# Patient Record
Sex: FEMALE | Race: WHITE | NOT HISPANIC OR LATINO | Employment: FULL TIME | URBAN - NONMETROPOLITAN AREA
[De-identification: names, ages, dates, MRNs, and addresses within clinical notes are randomized per-mention and may not be internally consistent; named-entity substitution may affect disease eponyms.]

---

## 2017-02-08 LAB
EXTERNAL ABO GROUPING: NORMAL
EXTERNAL ANTIBODY SCREEN: NEGATIVE
EXTERNAL CHLAMYDIA SCREEN: NORMAL
EXTERNAL GONORRHEA SCREEN: NORMAL
EXTERNAL HEPATITIS B SURFACE ANTIGEN: NEGATIVE
EXTERNAL RH FACTOR: POSITIVE
EXTERNAL RUBELLA QUALITATIVE: NORMAL
EXTERNAL SYPHILIS RPR SCREEN: NORMAL
HIV1 AB SPEC QL IA.RAPID: NEGATIVE

## 2017-07-24 ENCOUNTER — HOSPITAL ENCOUNTER (OUTPATIENT)
Facility: HOSPITAL | Age: 22
Discharge: HOME | End: 2017-07-24
Attending: OBSTETRICS & GYNECOLOGY | Admitting: OBSTETRICS & GYNECOLOGY

## 2017-07-24 VITALS
BODY MASS INDEX: 22.22 KG/M2 | RESPIRATION RATE: 16 BRPM | TEMPERATURE: 98.4 F | SYSTOLIC BLOOD PRESSURE: 119 MMHG | HEART RATE: 98 BPM | HEIGHT: 59 IN | DIASTOLIC BLOOD PRESSURE: 75 MMHG | WEIGHT: 110.2 LBS

## 2017-07-24 PROBLEM — Z34.90 PREGNANCY: Status: ACTIVE | Noted: 2017-07-24

## 2017-07-24 LAB
AMORPH URATE CRY URNS QL MICRO: ABNORMAL /HPF
BACTERIA UR QL AUTO: ABNORMAL /HPF
BASOPHILS # BLD AUTO: 0.01 10*3/MM3 (ref 0–0.3)
BASOPHILS NFR BLD AUTO: 0.1 % (ref 0–2)
BILIRUB UR QL STRIP: NEGATIVE
CLARITY UR: CLEAR
COLOR UR: YELLOW
DEPRECATED RDW RBC AUTO: 36.5 FL (ref 37–54)
EOSINOPHIL # BLD AUTO: 0.04 10*3/MM3 (ref 0–0.7)
EOSINOPHIL NFR BLD AUTO: 0.6 % (ref 0–5)
ERYTHROCYTE [DISTWIDTH] IN BLOOD BY AUTOMATED COUNT: 12.6 % (ref 11.5–14.5)
GLUCOSE UR STRIP-MCNC: NEGATIVE MG/DL
HCT VFR BLD AUTO: 30.7 % (ref 37–47)
HGB BLD-MCNC: 10 G/DL (ref 12–16)
HGB UR QL STRIP.AUTO: ABNORMAL
HYALINE CASTS UR QL AUTO: ABNORMAL /LPF
IMM GRANULOCYTES # BLD: 0.02 10*3/MM3 (ref 0–0.03)
IMM GRANULOCYTES NFR BLD: 0.3 % (ref 0–0.5)
KETONES UR QL STRIP: NEGATIVE
LEUKOCYTE ESTERASE UR QL STRIP.AUTO: NEGATIVE
LYMPHOCYTES # BLD AUTO: 1.97 10*3/MM3 (ref 1–3)
LYMPHOCYTES NFR BLD AUTO: 28.3 % (ref 21–51)
MCH RBC QN AUTO: 26.8 PG (ref 27–33)
MCHC RBC AUTO-ENTMCNC: 32.6 G/DL (ref 33–37)
MCV RBC AUTO: 82.3 FL (ref 80–94)
MONOCYTES # BLD AUTO: 0.5 10*3/MM3 (ref 0.1–0.9)
MONOCYTES NFR BLD AUTO: 7.2 % (ref 0–10)
NEUTROPHILS # BLD AUTO: 4.42 10*3/MM3 (ref 1.4–6.5)
NEUTROPHILS NFR BLD AUTO: 63.5 % (ref 30–70)
NITRITE UR QL STRIP: NEGATIVE
PH UR STRIP.AUTO: 7 [PH] (ref 5–8)
PLATELET # BLD AUTO: 273 10*3/MM3 (ref 130–400)
PMV BLD AUTO: 11.6 FL (ref 6–10)
PROT UR QL STRIP: NEGATIVE
RBC # BLD AUTO: 3.73 10*6/MM3 (ref 4.2–5.4)
RBC # UR: ABNORMAL /HPF
REF LAB TEST METHOD: ABNORMAL
SP GR UR STRIP: 1.01 (ref 1–1.03)
SQUAMOUS #/AREA URNS HPF: ABNORMAL /HPF
UROBILINOGEN UR QL STRIP: ABNORMAL
WBC NRBC COR # BLD: 6.96 10*3/MM3 (ref 4.5–12.5)
WBC UR QL AUTO: ABNORMAL /HPF

## 2017-07-24 PROCEDURE — 96372 THER/PROPH/DIAG INJ SC/IM: CPT

## 2017-07-24 PROCEDURE — G0463 HOSPITAL OUTPT CLINIC VISIT: HCPCS

## 2017-07-24 PROCEDURE — 81001 URINALYSIS AUTO W/SCOPE: CPT | Performed by: OBSTETRICS & GYNECOLOGY

## 2017-07-24 PROCEDURE — 25010000002 TERBUTALINE PER 1 MG

## 2017-07-24 PROCEDURE — 36415 COLL VENOUS BLD VENIPUNCTURE: CPT | Performed by: OBSTETRICS & GYNECOLOGY

## 2017-07-24 PROCEDURE — 85025 COMPLETE CBC W/AUTO DIFF WBC: CPT | Performed by: OBSTETRICS & GYNECOLOGY

## 2017-07-24 PROCEDURE — 59025 FETAL NON-STRESS TEST: CPT

## 2017-07-24 RX ORDER — TERBUTALINE SULFATE 1 MG/ML
0.25 INJECTION, SOLUTION SUBCUTANEOUS AS NEEDED
Status: DISCONTINUED | OUTPATIENT
Start: 2017-07-24 | End: 2017-07-24 | Stop reason: HOSPADM

## 2017-07-24 RX ORDER — PRENATAL VIT/IRON FUM/FOLIC AC 27MG-0.8MG
1 TABLET ORAL DAILY
COMMUNITY

## 2017-07-24 RX ORDER — TERBUTALINE SULFATE 1 MG/ML
INJECTION, SOLUTION SUBCUTANEOUS
Status: COMPLETED
Start: 2017-07-24 | End: 2017-07-24

## 2017-07-24 RX ADMIN — TERBUTALINE SULFATE 0.25 MG: 1 INJECTION, SOLUTION SUBCUTANEOUS at 16:51

## 2017-07-24 NOTE — NURSING NOTE
Patient presents with concerns of wiping and seeing blood. Placed on EFM, RNST obtained. CBC wnl, Cath UA showed small amount of blood. DR Bejarano notified of pt statis, orders to give brethine for irritability and discharge home. Nurys Connor RN

## 2017-07-24 NOTE — NON STRESS TEST
Bing Sterling, a  at 35w2d with an CLEMENTE of 2017, by Ultrasound, was seen at [facility] LABOR DELIVERY for a nonstress test.    Chief Complaint   Patient presents with   • Vaginal Bleeding                Nurys Connor, RN  FETAL NONSTRESS TEST REPORT      Gestational age: As recorded in hospital chart    Indication: See hospital chart    Accelerations: Present    Baseline fetal heart rate: 150    Decelerations: Few variables present    Conclusion: Reactive

## 2017-07-26 ENCOUNTER — TRANSCRIBE ORDERS (OUTPATIENT)
Dept: WOMENS IMAGING | Facility: HOSPITAL | Age: 22
End: 2017-07-26

## 2017-07-26 DIAGNOSIS — O36.5930 IUGR (INTRAUTERINE GROWTH RESTRICTION) AFFECTING CARE OF MOTHER, THIRD TRIMESTER, NOT APPLICABLE OR UNSPECIFIED FETUS: Primary | ICD-10-CM

## 2017-07-28 ENCOUNTER — HOSPITAL ENCOUNTER (OUTPATIENT)
Dept: WOMENS IMAGING | Facility: HOSPITAL | Age: 22
Discharge: HOME | End: 2017-07-28
Admitting: OBSTETRICS & GYNECOLOGY

## 2017-07-28 ENCOUNTER — OFFICE VISIT (OUTPATIENT)
Dept: OBSTETRICS AND GYNECOLOGY | Facility: HOSPITAL | Age: 22
End: 2017-07-28

## 2017-07-28 VITALS
BODY MASS INDEX: 23.34 KG/M2 | DIASTOLIC BLOOD PRESSURE: 76 MMHG | HEIGHT: 58 IN | WEIGHT: 111.2 LBS | SYSTOLIC BLOOD PRESSURE: 127 MMHG

## 2017-07-28 DIAGNOSIS — O36.5930 IUGR (INTRAUTERINE GROWTH RESTRICTION) AFFECTING CARE OF MOTHER, THIRD TRIMESTER, NOT APPLICABLE OR UNSPECIFIED FETUS: ICD-10-CM

## 2017-07-28 DIAGNOSIS — Z3A.36 36 WEEKS GESTATION OF PREGNANCY: Primary | ICD-10-CM

## 2017-07-28 DIAGNOSIS — O36.5991 IUGR (INTRAUTERINE GROWTH RESTRICTION) AFFECTING CARE OF MOTHER, UNSPECIFIED TRIMESTER, FETUS 1: ICD-10-CM

## 2017-07-28 PROBLEM — O36.5990 IUGR (INTRAUTERINE GROWTH RESTRICTION) AFFECTING CARE OF MOTHER: Status: ACTIVE | Noted: 2017-07-28

## 2017-07-28 PROCEDURE — 76811 OB US DETAILED SNGL FETUS: CPT | Performed by: OBSTETRICS & GYNECOLOGY

## 2017-07-28 PROCEDURE — 76811 OB US DETAILED SNGL FETUS: CPT

## 2017-07-28 NOTE — PROGRESS NOTES
Denies problems. States has not been offered genetic screening. Next visit with Dr. Bejarano 8/2/2017.

## 2017-07-28 NOTE — PROGRESS NOTES
Documentation of the ultrasound findings, images, and interpretations as well as consultation notes will be available in the patient's ViewPoint report located in the chart review imaging tab in [employer].

## 2017-08-03 LAB — EXTERNAL GROUP B STREP ANTIGEN: NEGATIVE

## 2017-08-23 ENCOUNTER — ANESTHESIA EVENT (OUTPATIENT)
Dept: LABOR AND DELIVERY | Facility: HOSPITAL | Age: 22
End: 2017-08-23

## 2017-08-23 ENCOUNTER — ANESTHESIA (OUTPATIENT)
Dept: LABOR AND DELIVERY | Facility: HOSPITAL | Age: 22
End: 2017-08-23

## 2017-08-23 ENCOUNTER — HOSPITAL ENCOUNTER (INPATIENT)
Facility: HOSPITAL | Age: 22
LOS: 2 days | Discharge: HOME | End: 2017-08-25
Attending: OBSTETRICS & GYNECOLOGY | Admitting: OBSTETRICS & GYNECOLOGY

## 2017-08-23 DIAGNOSIS — Z34.90 PREGNANCY, UNSPECIFIED GESTATIONAL AGE: Primary | ICD-10-CM

## 2017-08-23 LAB
ABO GROUP BLD: NORMAL
BLD GP AB SCN SERPL QL: NEGATIVE
DEPRECATED RDW RBC AUTO: 44.6 FL (ref 37–54)
ERYTHROCYTE [DISTWIDTH] IN BLOOD BY AUTOMATED COUNT: 14.8 % (ref 11.5–14.5)
HCT VFR BLD AUTO: 36.8 % (ref 37–47)
HGB BLD-MCNC: 11.1 G/DL (ref 12–16)
MCH RBC QN AUTO: 25.6 PG (ref 27–33)
MCHC RBC AUTO-ENTMCNC: 30.2 G/DL (ref 33–37)
MCV RBC AUTO: 85 FL (ref 80–94)
PLATELET # BLD AUTO: 233 10*3/MM3 (ref 130–400)
PMV BLD AUTO: 12.7 FL (ref 6–10)
RBC # BLD AUTO: 4.33 10*6/MM3 (ref 4.2–5.4)
RH BLD: POSITIVE
WBC NRBC COR # BLD: 7.7 10*3/MM3 (ref 4.5–12.5)

## 2017-08-23 PROCEDURE — C1755 CATHETER, INTRASPINAL: HCPCS | Performed by: ANESTHESIOLOGY

## 2017-08-23 PROCEDURE — 86901 BLOOD TYPING SEROLOGIC RH(D): CPT | Performed by: OBSTETRICS & GYNECOLOGY

## 2017-08-23 PROCEDURE — 25010000002 BUTORPHANOL PER 1 MG: Performed by: OBSTETRICS & GYNECOLOGY

## 2017-08-23 PROCEDURE — 85027 COMPLETE CBC AUTOMATED: CPT | Performed by: OBSTETRICS & GYNECOLOGY

## 2017-08-23 PROCEDURE — 86850 RBC ANTIBODY SCREEN: CPT | Performed by: OBSTETRICS & GYNECOLOGY

## 2017-08-23 PROCEDURE — 0DQP0ZZ REPAIR RECTUM, OPEN APPROACH: ICD-10-PCS | Performed by: OBSTETRICS & GYNECOLOGY

## 2017-08-23 PROCEDURE — C1755 CATHETER, INTRASPINAL: HCPCS

## 2017-08-23 PROCEDURE — 59025 FETAL NON-STRESS TEST: CPT

## 2017-08-23 PROCEDURE — 86900 BLOOD TYPING SEROLOGIC ABO: CPT | Performed by: OBSTETRICS & GYNECOLOGY

## 2017-08-23 RX ORDER — SODIUM CHLORIDE, SODIUM LACTATE, POTASSIUM CHLORIDE, CALCIUM CHLORIDE 600; 310; 30; 20 MG/100ML; MG/100ML; MG/100ML; MG/100ML
125 INJECTION, SOLUTION INTRAVENOUS CONTINUOUS
Status: DISCONTINUED | OUTPATIENT
Start: 2017-08-23 | End: 2017-08-23

## 2017-08-23 RX ORDER — ACETAMINOPHEN 325 MG/1
650 TABLET ORAL EVERY 4 HOURS PRN
Status: DISCONTINUED | OUTPATIENT
Start: 2017-08-23 | End: 2017-08-25 | Stop reason: HOSPADM

## 2017-08-23 RX ORDER — IBUPROFEN 600 MG/1
600 TABLET ORAL EVERY 8 HOURS PRN
Status: DISCONTINUED | OUTPATIENT
Start: 2017-08-23 | End: 2017-08-23

## 2017-08-23 RX ORDER — PROMETHAZINE HYDROCHLORIDE 25 MG/ML
12.5 INJECTION, SOLUTION INTRAMUSCULAR; INTRAVENOUS EVERY 6 HOURS PRN
Status: DISCONTINUED | OUTPATIENT
Start: 2017-08-23 | End: 2017-08-23 | Stop reason: HOSPADM

## 2017-08-23 RX ORDER — ONDANSETRON 2 MG/ML
4 INJECTION INTRAMUSCULAR; INTRAVENOUS EVERY 6 HOURS PRN
Status: DISCONTINUED | OUTPATIENT
Start: 2017-08-23 | End: 2017-08-25 | Stop reason: HOSPADM

## 2017-08-23 RX ORDER — DIPHENHYDRAMINE HCL 25 MG
25 CAPSULE ORAL NIGHTLY PRN
Status: DISCONTINUED | OUTPATIENT
Start: 2017-08-23 | End: 2017-08-23 | Stop reason: HOSPADM

## 2017-08-23 RX ORDER — LANOLIN 100 %
OINTMENT (GRAM) TOPICAL
Status: DISCONTINUED | OUTPATIENT
Start: 2017-08-23 | End: 2017-08-25 | Stop reason: HOSPADM

## 2017-08-23 RX ORDER — ONDANSETRON 2 MG/ML
4 INJECTION INTRAMUSCULAR; INTRAVENOUS EVERY 6 HOURS PRN
Status: DISCONTINUED | OUTPATIENT
Start: 2017-08-23 | End: 2017-08-23 | Stop reason: HOSPADM

## 2017-08-23 RX ORDER — OXYTOCIN/RINGER'S LACTATE 20/1000 ML
125 PLASTIC BAG, INJECTION (ML) INTRAVENOUS CONTINUOUS
Status: ACTIVE | OUTPATIENT
Start: 2017-08-23 | End: 2017-08-24

## 2017-08-23 RX ORDER — ONDANSETRON 4 MG/1
4 TABLET, ORALLY DISINTEGRATING ORAL EVERY 6 HOURS PRN
Status: DISCONTINUED | OUTPATIENT
Start: 2017-08-23 | End: 2017-08-23 | Stop reason: HOSPADM

## 2017-08-23 RX ORDER — IBUPROFEN 800 MG/1
800 TABLET ORAL EVERY 8 HOURS SCHEDULED
Status: DISCONTINUED | OUTPATIENT
Start: 2017-08-23 | End: 2017-08-25 | Stop reason: HOSPADM

## 2017-08-23 RX ORDER — CARBOPROST TROMETHAMINE 250 UG/ML
250 INJECTION, SOLUTION INTRAMUSCULAR AS NEEDED
Status: DISCONTINUED | OUTPATIENT
Start: 2017-08-23 | End: 2017-08-23 | Stop reason: HOSPADM

## 2017-08-23 RX ORDER — OXYTOCIN/RINGER'S LACTATE 20/1000 ML
2 PLASTIC BAG, INJECTION (ML) INTRAVENOUS CONTINUOUS
Status: DISCONTINUED | OUTPATIENT
Start: 2017-08-23 | End: 2017-08-23

## 2017-08-23 RX ORDER — HYDROCODONE BITARTRATE AND ACETAMINOPHEN 5; 325 MG/1; MG/1
1 TABLET ORAL EVERY 4 HOURS PRN
Status: DISCONTINUED | OUTPATIENT
Start: 2017-08-23 | End: 2017-08-25 | Stop reason: HOSPADM

## 2017-08-23 RX ORDER — LIDOCAINE HYDROCHLORIDE 10 MG/ML
5 INJECTION, SOLUTION INFILTRATION; PERINEURAL AS NEEDED
Status: DISCONTINUED | OUTPATIENT
Start: 2017-08-23 | End: 2017-08-23 | Stop reason: HOSPADM

## 2017-08-23 RX ORDER — DIPHENHYDRAMINE HYDROCHLORIDE 50 MG/ML
25 INJECTION INTRAMUSCULAR; INTRAVENOUS NIGHTLY PRN
Status: DISCONTINUED | OUTPATIENT
Start: 2017-08-23 | End: 2017-08-23 | Stop reason: HOSPADM

## 2017-08-23 RX ORDER — ONDANSETRON 4 MG/1
4 TABLET, FILM COATED ORAL EVERY 8 HOURS PRN
Status: DISCONTINUED | OUTPATIENT
Start: 2017-08-23 | End: 2017-08-25 | Stop reason: HOSPADM

## 2017-08-23 RX ORDER — PRENATAL VIT/IRON FUM/FOLIC AC 27MG-0.8MG
1 TABLET ORAL DAILY
Status: DISCONTINUED | OUTPATIENT
Start: 2017-08-24 | End: 2017-08-25 | Stop reason: HOSPADM

## 2017-08-23 RX ORDER — EPHEDRINE SULFATE 50 MG/ML
10 INJECTION, SOLUTION INTRAVENOUS
Status: DISCONTINUED | OUTPATIENT
Start: 2017-08-23 | End: 2017-08-23 | Stop reason: HOSPADM

## 2017-08-23 RX ORDER — BISACODYL 10 MG
10 SUPPOSITORY, RECTAL RECTAL DAILY PRN
Status: DISCONTINUED | OUTPATIENT
Start: 2017-08-24 | End: 2017-08-25 | Stop reason: HOSPADM

## 2017-08-23 RX ORDER — MAGNESIUM HYDROXIDE 1200 MG/15ML
1000 LIQUID ORAL ONCE AS NEEDED
Status: DISCONTINUED | OUTPATIENT
Start: 2017-08-23 | End: 2017-08-23 | Stop reason: HOSPADM

## 2017-08-23 RX ORDER — ROPIVACAINE HYDROCHLORIDE 2 MG/ML
INJECTION, SOLUTION EPIDURAL; INFILTRATION; PERINEURAL
Status: DISCONTINUED
Start: 2017-08-23 | End: 2017-08-25 | Stop reason: HOSPADM

## 2017-08-23 RX ORDER — TERBUTALINE SULFATE 1 MG/ML
0.25 INJECTION, SOLUTION SUBCUTANEOUS AS NEEDED
Status: DISCONTINUED | OUTPATIENT
Start: 2017-08-23 | End: 2017-08-23 | Stop reason: HOSPADM

## 2017-08-23 RX ORDER — FAMOTIDINE 10 MG/ML
20 INJECTION, SOLUTION INTRAVENOUS ONCE AS NEEDED
Status: DISCONTINUED | OUTPATIENT
Start: 2017-08-23 | End: 2017-08-23 | Stop reason: HOSPADM

## 2017-08-23 RX ORDER — SODIUM CHLORIDE 0.9 % (FLUSH) 0.9 %
1-10 SYRINGE (ML) INJECTION AS NEEDED
Status: DISCONTINUED | OUTPATIENT
Start: 2017-08-23 | End: 2017-08-25 | Stop reason: HOSPADM

## 2017-08-23 RX ORDER — BUPIVACAINE HYDROCHLORIDE 2.5 MG/ML
INJECTION, SOLUTION EPIDURAL; INFILTRATION; INTRACAUDAL
Status: DISCONTINUED
Start: 2017-08-23 | End: 2017-08-25 | Stop reason: HOSPADM

## 2017-08-23 RX ORDER — ZOLPIDEM TARTRATE 5 MG/1
5 TABLET ORAL NIGHTLY PRN
Status: DISCONTINUED | OUTPATIENT
Start: 2017-08-23 | End: 2017-08-25 | Stop reason: HOSPADM

## 2017-08-23 RX ORDER — ACETAMINOPHEN 325 MG/1
650 TABLET ORAL EVERY 4 HOURS PRN
Status: DISCONTINUED | OUTPATIENT
Start: 2017-08-23 | End: 2017-08-23 | Stop reason: HOSPADM

## 2017-08-23 RX ORDER — ONDANSETRON 4 MG/1
4 TABLET, FILM COATED ORAL EVERY 6 HOURS PRN
Status: DISCONTINUED | OUTPATIENT
Start: 2017-08-23 | End: 2017-08-23 | Stop reason: HOSPADM

## 2017-08-23 RX ORDER — BUPIVACAINE HYDROCHLORIDE 2.5 MG/ML
INJECTION, SOLUTION EPIDURAL; INFILTRATION; INTRACAUDAL AS NEEDED
Status: DISCONTINUED | OUTPATIENT
Start: 2017-08-23 | End: 2017-08-23 | Stop reason: SURG

## 2017-08-23 RX ORDER — PROMETHAZINE HYDROCHLORIDE 12.5 MG/1
12.5 SUPPOSITORY RECTAL EVERY 6 HOURS PRN
Status: DISCONTINUED | OUTPATIENT
Start: 2017-08-23 | End: 2017-08-23 | Stop reason: HOSPADM

## 2017-08-23 RX ORDER — MISOPROSTOL 100 UG/1
600 TABLET ORAL ONCE
Status: DISCONTINUED | OUTPATIENT
Start: 2017-08-23 | End: 2017-08-23

## 2017-08-23 RX ORDER — METHYLERGONOVINE MALEATE 0.2 MG/ML
200 INJECTION INTRAVENOUS ONCE AS NEEDED
Status: DISCONTINUED | OUTPATIENT
Start: 2017-08-23 | End: 2017-08-23 | Stop reason: HOSPADM

## 2017-08-23 RX ORDER — ONDANSETRON 2 MG/ML
4 INJECTION INTRAMUSCULAR; INTRAVENOUS ONCE AS NEEDED
Status: DISCONTINUED | OUTPATIENT
Start: 2017-08-23 | End: 2017-08-23 | Stop reason: HOSPADM

## 2017-08-23 RX ORDER — DOCUSATE SODIUM 100 MG/1
100 CAPSULE, LIQUID FILLED ORAL 2 TIMES DAILY
Status: DISCONTINUED | OUTPATIENT
Start: 2017-08-23 | End: 2017-08-25 | Stop reason: HOSPADM

## 2017-08-23 RX ORDER — SODIUM CHLORIDE 0.9 % (FLUSH) 0.9 %
1-10 SYRINGE (ML) INJECTION AS NEEDED
Status: DISCONTINUED | OUTPATIENT
Start: 2017-08-23 | End: 2017-08-23 | Stop reason: HOSPADM

## 2017-08-23 RX ORDER — MISOPROSTOL 100 UG/1
800 TABLET ORAL AS NEEDED
Status: DISCONTINUED | OUTPATIENT
Start: 2017-08-23 | End: 2017-08-23 | Stop reason: HOSPADM

## 2017-08-23 RX ORDER — ROPIVACAINE HYDROCHLORIDE 2 MG/ML
14 INJECTION, SOLUTION EPIDURAL; INFILTRATION; PERINEURAL CONTINUOUS
Status: DISCONTINUED | OUTPATIENT
Start: 2017-08-23 | End: 2017-08-23

## 2017-08-23 RX ORDER — OXYTOCIN/RINGER'S LACTATE 20/1000 ML
2 PLASTIC BAG, INJECTION (ML) INTRAVENOUS
Status: DISCONTINUED | OUTPATIENT
Start: 2017-08-23 | End: 2017-08-23 | Stop reason: HOSPADM

## 2017-08-23 RX ORDER — PROMETHAZINE HYDROCHLORIDE 25 MG/1
12.5 TABLET ORAL EVERY 6 HOURS PRN
Status: DISCONTINUED | OUTPATIENT
Start: 2017-08-23 | End: 2017-08-23 | Stop reason: HOSPADM

## 2017-08-23 RX ORDER — BUTORPHANOL TARTRATE 1 MG/ML
1 INJECTION, SOLUTION INTRAMUSCULAR; INTRAVENOUS
Status: DISCONTINUED | OUTPATIENT
Start: 2017-08-23 | End: 2017-08-23 | Stop reason: HOSPADM

## 2017-08-23 RX ADMIN — SODIUM CHLORIDE, SODIUM LACTATE, POTASSIUM CHLORIDE, AND CALCIUM CHLORIDE 125 ML/HR: 600; 310; 30; 20 INJECTION, SOLUTION INTRAVENOUS at 13:39

## 2017-08-23 RX ADMIN — BUTORPHANOL TARTRATE 1 MG: 1 INJECTION, SOLUTION INTRAMUSCULAR; INTRAVENOUS at 13:18

## 2017-08-23 RX ADMIN — OXYTOCIN 150 ML/HR: 10 INJECTION INTRAVENOUS at 17:34

## 2017-08-23 RX ADMIN — BUTORPHANOL TARTRATE 1 MG: 1 INJECTION, SOLUTION INTRAMUSCULAR; INTRAVENOUS at 10:55

## 2017-08-23 RX ADMIN — SODIUM CHLORIDE, SODIUM LACTATE, POTASSIUM CHLORIDE, AND CALCIUM CHLORIDE 125 ML/HR: 600; 310; 30; 20 INJECTION, SOLUTION INTRAVENOUS at 06:24

## 2017-08-23 RX ADMIN — SODIUM CHLORIDE, POTASSIUM CHLORIDE, SODIUM LACTATE AND CALCIUM CHLORIDE 1000 ML: 600; 310; 30; 20 INJECTION, SOLUTION INTRAVENOUS at 13:27

## 2017-08-23 RX ADMIN — IBUPROFEN 800 MG: 800 TABLET ORAL at 22:11

## 2017-08-23 RX ADMIN — OXYTOCIN 2 MILLI-UNITS/MIN: 10 INJECTION INTRAVENOUS at 07:15

## 2017-08-23 RX ADMIN — BUPIVACAINE HYDROCHLORIDE 10 ML: 2.5 INJECTION, SOLUTION EPIDURAL; INFILTRATION; INTRACAUDAL; PERINEURAL at 14:01

## 2017-08-23 NOTE — PLAN OF CARE
Problem: Labor (Cervical Ripen, Induct, Augment) (Adult,Obstetrics,Pediatric)  Goal: Signs and Symptoms of Listed Potential Problems Will be Absent or Manageable (Labor)  Outcome: Ongoing (interventions implemented as appropriate)    08/23/17 0616   Labor (Cervical Ripen, Induct, Augment)   Problems Assessed (Labor) all   Problems Present (Labor) none

## 2017-08-23 NOTE — NURSING NOTE
New admit to room 228 to the services of Dr. Carlee patino 22 y.o. @ 39 4/7 weeks gestation here for induction of labor for term

## 2017-08-23 NOTE — H&P
HISTORY    Chief complaint  Term pregnancy for induction    History of present illness  This 22 year old  patient at 39 weeks +4 days is admitted to the hospital with a term pregnancy for induction and delivery.    Past medical, surgical, obstetrical history  See attached prenatal record    Family history  See attached prenatal record    Social history  See attached prenatal record    Functional inquiry  See attached prenatal record                             PHYSICAL EXAMINATION    General  In no acute distress    HEENT  Throat and ears are clear, no masses or nodes were palpable    CVR  Heart sounds are normal with no murmurs, chest is clear to IPPA    GI/  Abdomen is soft with no masses tenderness or organomegaly.  Uterine size is commensurate with her dates.  Cervix is as noted elsewhere in chart    MS  No gross bone or joint abnormalities.                                        IMPRESSION    Term pregnancy for induction and delivery    TREATMENT PLAN    I ruptured her membranes for clear fluid.  Her cervix is 2 cm, 50%, -2.  We have Pitocin running.  Spontaneous vaginal delivery.

## 2017-08-23 NOTE — NON STRESS TEST
Bing Sterling, a  at 39w4d with an CLEMENTE of 2017, by Ultrasound, was seen at [facility] LABOR DELIVERY for a nonstress test.    Chief Complaint   Patient presents with   • Scheduled Induction       Interpretation A  Nonstress Test Interpretation A: Reactive (17 : Suzie Romo, RN)  Comments A: Verified by SARAH Laurent RN (17 : Suzie Romo RN)        Reason for test: OB Triage (Scheduled Induction)  Date of Test: 2017  Time frame of test: 20 minutes  RN NST Interpretation: Reactive

## 2017-08-23 NOTE — L&D DELIVERY NOTE
Vaginal Delivery Procedure Note    Bing Sterling  Gestational Age: <None> .        OBGYN: Juan Pablo Bejarano MD      Pre-op Diagnosis: Complete Dilation      Anesthesia: Epidual        Detailed Description of Procedure   This patient came to the hospitalist morning with a term pregnancy for induction and delivery.  Membranes were ruptured, Pitocin was started, and she had a normal course of labor.  She went on to deliver a healthy female child named Antonette .  She had bradycardia just before delivery and she was not pushing effectively and I felt that we should expedite delivery for the baby's sake.  The baby was sitting right on the perineum.  I applied a Kiwi vacuum extractor and with one easy pull, she delivered.  She sustained a fourth degree extension of her midline episiotomy.  This was sutured in the usual fashion, taking care to put interrupted sutures in the rectal mucosa, followed by interrupted sutures in the rectal sphincter capsule at 3:00, 6:00, 9:00, 12:00, with a figure of 8 suture in the muscle itself.  Baby is doing well.  Placenta delivered intact. Father, mother, and baby are all stable in the birthing room at this time.         Maternal Blood Type: A   Amniotic Fluid Clear  Blood Cord: Yes  Estimated Blood Loss: * No surgery found *  Placenta: Spontaneous, Delivered Intact   Uterus Explored: Yes    Disposition: Transfer to Women's Health Floor  Condition: Stable    Juan Pablo Bejarano M.D     Date: 8/23/2017  Time: 4:52 PM

## 2017-08-23 NOTE — ANESTHESIA PREPROCEDURE EVALUATION
Anesthesia Evaluation     Patient summary reviewed and Nursing notes reviewed   no history of anesthetic complications:  NPO Solid Status: > 8 hours  NPO Liquid Status: > 8 hours     Airway   Mallampati: II  TM distance: >3 FB  Neck ROM: full  no difficulty expected  Dental - normal exam     Pulmonary - negative pulmonary ROS and normal exam   Cardiovascular - negative cardio ROS and normal exam  Exercise tolerance: good (4-7 METS)    NYHA Classification: II        Neuro/Psych- negative ROS  GI/Hepatic/Renal/Endo - negative ROS     Musculoskeletal (-) negative ROS    Abdominal  - normal exam   Substance History - negative use     OB/GYN negative ob/gyn ROS         Other - negative ROS                                       Anesthesia Plan    ASA 2     epidural     intravenous induction   Anesthetic plan and risks discussed with patient.

## 2017-08-23 NOTE — ANESTHESIA PROCEDURE NOTES
Labor Epidural    Patient location during procedure: OB  Start Time: 8/23/2017 1:58 PM  Stop Time: 8/23/2017 2:06 PM  Performed By  Anesthesiologist: JUAN CARLOS WU  Preanesthetic Checklist  Completed: patient identified, site marked, surgical consent, pre-op evaluation, timeout performed, IV checked, risks and benefits discussed and monitors and equipment checked  Prep:  Pt Position:sitting  Sterile Tech:gloves, mask, sterile barrier and cap  Prep:povidone-iodine 7.5% surgical scrub  Monitoring:blood pressure monitoring  Epidural Block Procedure:  Approach:midline  Guidance:landmark technique and palpation technique  Location:L3-L4  Needle Type:Tuohy  Needle Gauge:17 G  Loss of Resistance Medium: air  Loss of Resistance: 8cm  Cath Depth at skin:11 cm  Paresthesia: none  Aspiration:negative  Test Dose:negative  Number of Attempts: 1  Post Assessment:  Dressing:occlusive dressing applied and secured with tape  Pt Tolerance:patient tolerated the procedure well with no apparent complications  Complications:no

## 2017-08-24 LAB
BASOPHILS # BLD AUTO: 0.02 10*3/MM3 (ref 0–0.3)
BASOPHILS NFR BLD AUTO: 0.2 % (ref 0–2)
DEPRECATED RDW RBC AUTO: 41.4 FL (ref 37–54)
EOSINOPHIL # BLD AUTO: 0.04 10*3/MM3 (ref 0–0.7)
EOSINOPHIL NFR BLD AUTO: 0.4 % (ref 0–5)
ERYTHROCYTE [DISTWIDTH] IN BLOOD BY AUTOMATED COUNT: 14.3 % (ref 11.5–14.5)
HCT VFR BLD AUTO: 27.5 % (ref 37–47)
HGB BLD-MCNC: 8.7 G/DL (ref 12–16)
IMM GRANULOCYTES # BLD: 0.03 10*3/MM3 (ref 0–0.03)
IMM GRANULOCYTES NFR BLD: 0.3 % (ref 0–0.5)
LYMPHOCYTES # BLD AUTO: 2.44 10*3/MM3 (ref 1–3)
LYMPHOCYTES NFR BLD AUTO: 23.8 % (ref 21–51)
MCH RBC QN AUTO: 25.4 PG (ref 27–33)
MCHC RBC AUTO-ENTMCNC: 31.6 G/DL (ref 33–37)
MCV RBC AUTO: 80.2 FL (ref 80–94)
MONOCYTES # BLD AUTO: 0.94 10*3/MM3 (ref 0.1–0.9)
MONOCYTES NFR BLD AUTO: 9.2 % (ref 0–10)
NEUTROPHILS # BLD AUTO: 6.8 10*3/MM3 (ref 1.4–6.5)
NEUTROPHILS NFR BLD AUTO: 66.1 % (ref 30–70)
PLATELET # BLD AUTO: 187 10*3/MM3 (ref 130–400)
PMV BLD AUTO: 12.4 FL (ref 6–10)
RBC # BLD AUTO: 3.43 10*6/MM3 (ref 4.2–5.4)
WBC NRBC COR # BLD: 10.27 10*3/MM3 (ref 4.5–12.5)

## 2017-08-24 PROCEDURE — 85025 COMPLETE CBC W/AUTO DIFF WBC: CPT | Performed by: OBSTETRICS & GYNECOLOGY

## 2017-08-24 RX ADMIN — BENZOCAINE AND MENTHOL: 20; .5 SPRAY TOPICAL at 14:54

## 2017-08-24 RX ADMIN — HYDROCODONE BITARTRATE AND ACETAMINOPHEN 1 TABLET: 5; 325 TABLET ORAL at 14:54

## 2017-08-24 RX ADMIN — DOCUSATE SODIUM 100 MG: 100 CAPSULE, LIQUID FILLED ORAL at 09:16

## 2017-08-24 RX ADMIN — IBUPROFEN 800 MG: 800 TABLET ORAL at 06:35

## 2017-08-24 RX ADMIN — IBUPROFEN 800 MG: 800 TABLET ORAL at 21:20

## 2017-08-24 RX ADMIN — PRENATAL VIT W/ FE FUMARATE-FA TAB 27-0.8 MG 1 TABLET: 27-0.8 TAB at 09:16

## 2017-08-24 RX ADMIN — DOCUSATE SODIUM 100 MG: 100 CAPSULE, LIQUID FILLED ORAL at 18:05

## 2017-08-24 RX ADMIN — IBUPROFEN 800 MG: 800 TABLET ORAL at 14:54

## 2017-08-24 NOTE — PLAN OF CARE
Problem: Patient Care Overview (Adult)  Goal: Plan of Care Review  Outcome: Ongoing (interventions implemented as appropriate)    08/24/17 0551   Coping/Psychosocial Response Interventions   Plan Of Care Reviewed With patient;spouse       Goal: Adult Individualization and Mutuality  Outcome: Ongoing (interventions implemented as appropriate)  Goal: Discharge Needs Assessment  Outcome: Ongoing (interventions implemented as appropriate)    Problem: Postpartum, Vaginal Delivery (Adult)  Intervention: Support Life/Role Transition    08/23/17 5477   Coping Strategies   Trust Relationship/Rapport care explained;choices provided;questions answered;questions encouraged;reassurance provided   Family/Support System Care involvement promoted;presence promoted;support provided         Goal: Signs and Symptoms of Listed Potential Problems Will be Absent or Manageable (Postpartum, Vaginal Delivery)  Outcome: Ongoing (interventions implemented as appropriate)

## 2017-08-24 NOTE — PAYOR COMM NOTE
"CONTACT:  LUIS DAILEY RN, BSN  UTILIZATION MANAGEMENT DEPT.  [facility]  [address] [facility] [address]  PHONE:  [phone]  FAX: [phone]    REQUEST FOR INPATIENT DELIVERY AUTHORIZATION.    ICD 10 CODE: O80  [facility] NPI: [id number]  DR. CARLA BEJARANO NPI: [id number]    PT ADMITTED 17, DELIVERED VAGINALLY 17, WELL BABY GIRL, REGULAR NURSERY, WEIGHT 2752 GRAMS, APGARS 9/9, EDC 17, GA 39/4, . BABY'S NAME NOT AVAILABLE YET.    Bing Sterling (22 y.o. Female)     Date of Birth Social Security Number Address Home Phone MRN    1995  [address]  [facility] [address] [phone] [id number]    [place] Marital Status          None        Admission Date Admission Type Admitting Provider Attending Provider Department, Room/Bed    17 Elective Carla Bejarano MD Cass, Roger Leroy, MD [facility], W243/    Discharge Date Discharge Disposition Discharge Destination                      Attending Provider: Carla Bejarano MD     Allergies:  No Known Allergies    Isolation:  None   Infection:  None   Code Status:  FULL    Ht:  59\" (149.9 cm)   Wt:  115 lb (52.2 kg)    Admission Cmt:  None   Principal Problem:  None                Active Insurance as of 2017     Primary Coverage     Payor Plan Insurance Group Employer/Plan Group    ANTHEM MEDICAID [employer] MEDICAID [id number]     Payor Plan Address Payor Plan Phone Number Effective From Effective To    [address] [address] [phone] 2017     [place] [address]       Subscriber Name Subscriber Birth Date Member ID       BING STERLING 1995 [id number]                 Emergency Contacts      (Rel.) Home Phone Work Phone Mobile Phone    Daren Sterling (Spouse) [phone] -- --               History & Physical      H&P filed by Beatriz Santos MD at 2017  9:03 AM      Scan on 2017 : [employer]  2017 (below)              " Electronically signed by Gemini, Cesario Incoming at 2017  9:03 AM      Juan Pablo Bejarano MD at 2017  9:14 AM                                                     HISTORY    Chief complaint  Term pregnancy for induction    History of present illness  This 22 year old  patient at 39 weeks +4 days is admitted to the hospital with a term pregnancy for induction and delivery.    Past medical, surgical, obstetrical history  See attached prenatal record    Family history  See attached prenatal record    Social history  See attached prenatal record    Functional inquiry  See attached prenatal record                             PHYSICAL EXAMINATION    General  In no acute distress    HEENT  Throat and ears are clear, no masses or nodes were palpable    CVR  Heart sounds are normal with no murmurs, chest is clear to IPPA    GI/  Abdomen is soft with no masses tenderness or organomegaly.  Uterine size is commensurate with her dates.  Cervix is as noted elsewhere in chart    MS  No gross bone or joint abnormalities.                                        IMPRESSION    Term pregnancy for induction and delivery    TREATMENT PLAN    I ruptured her membranes for clear fluid.  Her cervix is 2 cm, 50%, -2.  We have Pitocin running.  Spontaneous vaginal delivery.         Electronically signed by Juan Pablo Bejarano MD at 2017  9:14 AM           Operative/Procedure Notes (all)      Juan Pablo Bejarano MD at 2017  4:52 PM  Version 1 of 1         Vaginal Delivery Procedure Note    Bing Sterling  Gestational Age: <None> .        OBGYN: Juan Pablo Bejarano MD      Pre-op Diagnosis: Complete Dilation      Anesthesia: Epidual        Detailed Description of Procedure   This patient came to the hospitalist morning with a term pregnancy for induction and delivery.  Membranes were ruptured, Pitocin was started, and she had a normal course of labor.  She went on to deliver a healthy female child named Antonette .  She had  bradycardia just before delivery and she was not pushing effectively and I felt that we should expedite delivery for the baby's sake.  The baby was sitting right on the perineum.  I applied a Kiwi vacuum extractor and with one easy pull, she delivered.  She sustained a fourth degree extension of her midline episiotomy.  This was sutured in the usual fashion, taking care to put interrupted sutures in the rectal mucosa, followed by interrupted sutures in the rectal sphincter capsule at 3:00, 6:00, 9:00, 12:00, with a figure of 8 suture in the muscle itself.  Baby is doing well.  Placenta delivered intact. Father, mother, and baby are all stable in the birthing room at this time.         Maternal Blood Type: A   Amniotic Fluid Clear  Blood Cord: Yes  Estimated Blood Loss: * No surgery found *  Placenta: Spontaneous, Delivered Intact   Uterus Explored: Yes    Disposition: Transfer to Women's Health Floor  Condition: Stable    Juan Pablo Bejarano M.D     Date: 8/23/2017  Time: 4:52 PM         Electronically signed by Juan Pablo Bejarano MD at 8/23/2017  4:52 PM        Physician Progress Notes (all)     No notes of this type exist for this encounter.

## 2017-08-25 VITALS
RESPIRATION RATE: 18 BRPM | OXYGEN SATURATION: 100 % | HEART RATE: 72 BPM | SYSTOLIC BLOOD PRESSURE: 120 MMHG | TEMPERATURE: 97.9 F | BODY MASS INDEX: 23.18 KG/M2 | HEIGHT: 59 IN | DIASTOLIC BLOOD PRESSURE: 77 MMHG | WEIGHT: 115 LBS

## 2017-08-25 PROBLEM — Z34.90 PREGNANCY: Status: RESOLVED | Noted: 2017-07-24 | Resolved: 2017-08-25

## 2017-08-25 PROBLEM — O36.5990 IUGR (INTRAUTERINE GROWTH RESTRICTION) AFFECTING CARE OF MOTHER: Status: RESOLVED | Noted: 2017-07-28 | Resolved: 2017-08-25

## 2017-08-25 RX ORDER — IBUPROFEN 600 MG/1
600 TABLET ORAL EVERY 8 HOURS SCHEDULED
Qty: 40 TABLET | Refills: 1 | Status: ON HOLD | OUTPATIENT
Start: 2017-08-25 | End: 2021-12-21

## 2017-08-25 RX ORDER — PSEUDOEPHEDRINE HCL 30 MG
100 TABLET ORAL 2 TIMES DAILY
Qty: 60 CAPSULE | Refills: 1 | Status: ON HOLD | OUTPATIENT
Start: 2017-08-25 | End: 2021-12-21

## 2017-08-25 RX ORDER — FERROUS SULFATE 325(65) MG
325 TABLET ORAL 2 TIMES DAILY WITH MEALS
Status: DISCONTINUED | OUTPATIENT
Start: 2017-08-25 | End: 2017-08-25 | Stop reason: HOSPADM

## 2017-08-25 RX ORDER — LANOLIN ALCOHOL/MO/W.PET/CERES
325 CREAM (GRAM) TOPICAL 2 TIMES DAILY WITH MEALS
Qty: 60 TABLET | Refills: 1 | Status: ON HOLD | OUTPATIENT
Start: 2017-08-25 | End: 2022-02-28

## 2017-08-25 RX ADMIN — IBUPROFEN 800 MG: 800 TABLET ORAL at 06:53

## 2017-08-25 RX ADMIN — PRENATAL VIT W/ FE FUMARATE-FA TAB 27-0.8 MG 1 TABLET: 27-0.8 TAB at 08:05

## 2017-08-25 RX ADMIN — DOCUSATE SODIUM 100 MG: 100 CAPSULE, LIQUID FILLED ORAL at 08:05

## 2017-08-25 NOTE — PROGRESS NOTES
" [place]  Vaginal Delivery Progress Note    Subjective   Subjective  Postpartum Day 2: Vaginal Delivery    The patient feels tired.  Her pain is moderately controlled with nonsteroidal anti-inflammatory drugs.   She is ambulating well.  Patient describes her bleeding as moderate lochia. Pt denies BM since delivery will continue stool softeners at home.  All questions anwered.    Breastfeeding: declines.    Objective     Objective   Vital Signs Range for the last 24 hours  Temperature:  97.1   Temp Source:     BP:  126/88   Pulse:  75   Respirations:  18   Weight:  115     Admit Height:  Height: 59\" (149.9 cm)    Physical Exam:  General:  no acute distresss.  Abdomen: Fundus: appropriate, firm, non tender  Extremities: normal, atraumatic, no cyanosis, and trace edema.       [unfilled]       Lab Results   Component Value Date    ABO A 08/23/2017    RH Positive 08/23/2017        Lab Results   Component Value Date    HGB 8.7 (L) 08/24/2017    HCT 27.5 (L) 08/24/2017         Assessment/Plan   Assessment & Plan  Active Problems:    Pregnancy      Bing Sterling is Day 2  post-partum  Vaginal, Vacuum (Extractor)  4th degree extension .      Plan:  Continue current care.      GABRIELA Short  8/25/2017  8:42 AM    "

## 2017-08-25 NOTE — PLAN OF CARE
Problem: Patient Care Overview (Adult)  Goal: Plan of Care Review  Outcome: Ongoing (interventions implemented as appropriate)    08/25/17 0333   Coping/Psychosocial Response Interventions   Plan Of Care Reviewed With patient   Patient Care Overview   Progress progress toward functional goals as expected   Outcome Evaluation   Outcome Summary/Follow up Plan pt done well. no c/o pain this shift. bleeding normal.        Goal: Adult Individualization and Mutuality  Outcome: Ongoing (interventions implemented as appropriate)  Goal: Discharge Needs Assessment  Outcome: Ongoing (interventions implemented as appropriate)    Problem: Postpartum, Vaginal Delivery (Adult)  Goal: Signs and Symptoms of Listed Potential Problems Will be Absent or Manageable (Postpartum, Vaginal Delivery)  Outcome: Ongoing (interventions implemented as appropriate)

## 2017-08-25 NOTE — DISCHARGE SUMMARY
LEATHA Lerma  Delivery Discharge Summary    Primary OB Clinician:     EDC: Estimated Date of Delivery: 17    Gestational Age:39w4d    Antepartum complications: none    Date of Delivery: 2017   Time of Delivery: 4:04 PM     Delivered By:  Juan Pablo Bejarano     Delivery Type: Vaginal, Vacuum (Extractor)      Tubal Ligation: n/a    Baby:Female   Apgar:  9   @ 1 minute /   Apgar:  9   @ 5 minutes   Weight:  6lb 1.1oz    Anesthesia: Epidural      Intrapartum complications: None    Laceration: Yes  Laceration Information  Laceration Repaired?   Perineal: 4th       Periurethral:         Labial:         Sulcus:         Vaginal: No       Cervical: No              Episiotomy: Yes     Placenta: Spontaneous     Feeding method: Breastfeeding Status: Unknown    [unfilled]       Lab Results   Component Value Date    ABO A 2017    RH Positive 2017        Lab Results   Component Value Date    HGB 8.7 (L) 2017    HCT 27.5 (L) 2017       Rh Immune globulin given: not applicable      Discharge Date: 2017; Discharge Time: 9:47 AM        Plan:    Address and phone number verified and same.  Follow-up appointment with  in 3 weeks.      GABRIELA Short  2017  9:45 AM

## 2017-08-25 NOTE — PLAN OF CARE
Problem: Patient Care Overview (Adult)  Goal: Plan of Care Review  Outcome: Outcome(s) achieved Date Met:  08/25/17 08/25/17 1145   Coping/Psychosocial Response Interventions   Plan Of Care Reviewed With patient   Patient Care Overview   Progress progress toward functional goals as expected   Outcome Evaluation   Outcome Summary/Follow up Plan patient is doing well, being discharged home able to care for self and infant,        Goal: Adult Individualization and Mutuality  Outcome: Outcome(s) achieved Date Met:  08/25/17  Goal: Discharge Needs Assessment  Outcome: Outcome(s) achieved Date Met:  08/25/17    Problem: Postpartum, Vaginal Delivery (Adult)  Goal: Signs and Symptoms of Listed Potential Problems Will be Absent or Manageable (Postpartum, Vaginal Delivery)  Outcome: Outcome(s) achieved Date Met:  08/25/17

## 2017-08-26 NOTE — PAYOR COMM NOTE
"[facility]   ONESIMO ARGUELLO  PHONE  [phone]  FAX  [phone]    PATIENT D/C 8/25/17    Bing Ortiz (22 y.o. Female)     Date of Birth Social Security Number Address Home Phone MRN    1995  [address]  [place] [address] [phone] [id number]    [place] Marital Status          None        Admission Date Admission Type Admitting Provider Attending Provider Department, Room/Bed    8/23/17 Elective Juan Pablo Bejarano MD  [facility], W243/1    Discharge Date Discharge Disposition Discharge Destination        8/25/2017 Home or Self Care Home            Attending Provider: (none)    Allergies:  No Known Allergies    Isolation:  None   Infection:  None   Code Status:  Prior    Ht:  59\" (149.9 cm)   Wt:  115 lb (52.2 kg)    Admission Cmt:  None   Principal Problem:  None                Active Insurance as of 8/23/2017     Primary Coverage     Payor Plan Insurance Group Employer/Plan Group    ANTH MEDICAID [facility] MEDICAID [id number]     Payor Plan Address Payor Plan Phone Number Effective From Effective To    [address] [address] [phone] 1/1/2017     [place] [address]       Subscriber Name Subscriber Birth Date Member ID       BING ORTIZ 1995 [id number]                 Emergency Contacts      (Rel.) Home Phone Work Phone Mobile Phone    Daren Ortiz (Spouse) [phone] -- --              "

## 2019-07-02 ENCOUNTER — HOSPITAL ENCOUNTER (EMERGENCY)
Facility: HOSPITAL | Age: 24
Discharge: HOME | End: 2019-07-02
Attending: EMERGENCY MEDICINE | Admitting: EMERGENCY MEDICINE

## 2019-07-02 VITALS
BODY MASS INDEX: 17.14 KG/M2 | SYSTOLIC BLOOD PRESSURE: 117 MMHG | HEART RATE: 93 BPM | RESPIRATION RATE: 20 BRPM | DIASTOLIC BLOOD PRESSURE: 87 MMHG | OXYGEN SATURATION: 100 % | WEIGHT: 85 LBS | HEIGHT: 59 IN | TEMPERATURE: 98.2 F

## 2019-07-02 DIAGNOSIS — J02.9 ACUTE PHARYNGITIS, UNSPECIFIED ETIOLOGY: Primary | ICD-10-CM

## 2019-07-02 DIAGNOSIS — R00.0 SINUS TACHYCARDIA: ICD-10-CM

## 2019-07-02 LAB
ALBUMIN SERPL-MCNC: 4.27 G/DL (ref 3.5–5.2)
ALBUMIN/GLOB SERPL: 1.3 G/DL
ALP SERPL-CCNC: 55 U/L (ref 39–117)
ALT SERPL W P-5'-P-CCNC: 8 U/L (ref 1–33)
ANION GAP SERPL CALCULATED.3IONS-SCNC: 15.6 MMOL/L (ref 5–15)
AST SERPL-CCNC: 18 U/L (ref 1–32)
BASOPHILS # BLD AUTO: 0.01 10*3/MM3 (ref 0–0.2)
BASOPHILS NFR BLD AUTO: 0.1 % (ref 0–1.5)
BILIRUB SERPL-MCNC: 0.3 MG/DL (ref 0.2–1.2)
BUN BLD-MCNC: 8 MG/DL (ref 6–20)
BUN/CREAT SERPL: 13.6 (ref 7–25)
CALCIUM SPEC-SCNC: 9.3 MG/DL (ref 8.6–10.5)
CHLORIDE SERPL-SCNC: 103 MMOL/L (ref 98–107)
CO2 SERPL-SCNC: 19.4 MMOL/L (ref 22–29)
CREAT BLD-MCNC: 0.59 MG/DL (ref 0.57–1)
DEPRECATED RDW RBC AUTO: 38.4 FL (ref 37–54)
EOSINOPHIL # BLD AUTO: 0 10*3/MM3 (ref 0–0.4)
EOSINOPHIL NFR BLD AUTO: 0 % (ref 0.3–6.2)
ERYTHROCYTE [DISTWIDTH] IN BLOOD BY AUTOMATED COUNT: 12.2 % (ref 12.3–15.4)
GFR SERPL CREATININE-BSD FRML MDRD: 127 ML/MIN/1.73
GLOBULIN UR ELPH-MCNC: 3.2 GM/DL
GLUCOSE BLD-MCNC: 92 MG/DL (ref 65–99)
HCT VFR BLD AUTO: 37.5 % (ref 34–46.6)
HETEROPH AB SER QL LA: NEGATIVE
HGB BLD-MCNC: 12.8 G/DL (ref 12–15.9)
IMM GRANULOCYTES # BLD AUTO: 0.01 10*3/MM3 (ref 0–0.05)
IMM GRANULOCYTES NFR BLD AUTO: 0.1 % (ref 0–0.5)
LYMPHOCYTES # BLD AUTO: 0.47 10*3/MM3 (ref 0.7–3.1)
LYMPHOCYTES NFR BLD AUTO: 5.7 % (ref 19.6–45.3)
MAGNESIUM SERPL-MCNC: 1.7 MG/DL (ref 1.6–2.6)
MCH RBC QN AUTO: 29.4 PG (ref 26.6–33)
MCHC RBC AUTO-ENTMCNC: 34.1 G/DL (ref 31.5–35.7)
MCV RBC AUTO: 86 FL (ref 79–97)
MONOCYTES # BLD AUTO: 0.63 10*3/MM3 (ref 0.1–0.9)
MONOCYTES NFR BLD AUTO: 7.7 % (ref 5–12)
NEUTROPHILS # BLD AUTO: 7.09 10*3/MM3 (ref 1.7–7)
NEUTROPHILS NFR BLD AUTO: 86.4 % (ref 42.7–76)
PLATELET # BLD AUTO: 215 10*3/MM3 (ref 140–450)
PMV BLD AUTO: 10.9 FL (ref 6–12)
POTASSIUM BLD-SCNC: 3.8 MMOL/L (ref 3.5–5.2)
PROT SERPL-MCNC: 7.5 G/DL (ref 6–8.5)
RBC # BLD AUTO: 4.36 10*6/MM3 (ref 3.77–5.28)
S PYO AG THROAT QL: NEGATIVE
SODIUM BLD-SCNC: 138 MMOL/L (ref 136–145)
T4 FREE SERPL-MCNC: 1.1 NG/DL (ref 0.93–1.7)
TROPONIN T SERPL-MCNC: <0.01 NG/ML (ref 0–0.03)
TSH SERPL DL<=0.05 MIU/L-ACNC: 0.55 MIU/ML (ref 0.27–4.2)
WBC NRBC COR # BLD: 8.21 10*3/MM3 (ref 3.4–10.8)

## 2019-07-02 PROCEDURE — 93005 ELECTROCARDIOGRAM TRACING: CPT | Performed by: EMERGENCY MEDICINE

## 2019-07-02 PROCEDURE — 86308 HETEROPHILE ANTIBODY SCREEN: CPT | Performed by: EMERGENCY MEDICINE

## 2019-07-02 PROCEDURE — 84484 ASSAY OF TROPONIN QUANT: CPT | Performed by: EMERGENCY MEDICINE

## 2019-07-02 PROCEDURE — 83735 ASSAY OF MAGNESIUM: CPT | Performed by: EMERGENCY MEDICINE

## 2019-07-02 PROCEDURE — 80053 COMPREHEN METABOLIC PANEL: CPT | Performed by: EMERGENCY MEDICINE

## 2019-07-02 PROCEDURE — 84443 ASSAY THYROID STIM HORMONE: CPT | Performed by: EMERGENCY MEDICINE

## 2019-07-02 PROCEDURE — 87880 STREP A ASSAY W/OPTIC: CPT | Performed by: EMERGENCY MEDICINE

## 2019-07-02 PROCEDURE — 96360 HYDRATION IV INFUSION INIT: CPT

## 2019-07-02 PROCEDURE — 99284 EMERGENCY DEPT VISIT MOD MDM: CPT

## 2019-07-02 PROCEDURE — 87081 CULTURE SCREEN ONLY: CPT | Performed by: EMERGENCY MEDICINE

## 2019-07-02 PROCEDURE — 84439 ASSAY OF FREE THYROXINE: CPT | Performed by: EMERGENCY MEDICINE

## 2019-07-02 PROCEDURE — 85025 COMPLETE CBC W/AUTO DIFF WBC: CPT | Performed by: EMERGENCY MEDICINE

## 2019-07-02 RX ORDER — ACETAMINOPHEN 500 MG
500 TABLET ORAL EVERY 6 HOURS PRN
Qty: 50 TABLET | Refills: 0 | Status: ON HOLD | OUTPATIENT
Start: 2019-07-02 | End: 2022-02-28

## 2019-07-02 RX ORDER — IBUPROFEN 600 MG/1
600 TABLET ORAL EVERY 6 HOURS PRN
Qty: 40 TABLET | Refills: 0 | Status: ON HOLD | OUTPATIENT
Start: 2019-07-02 | End: 2021-12-21

## 2019-07-02 RX ORDER — ACETAMINOPHEN 500 MG
1000 TABLET ORAL ONCE
Status: COMPLETED | OUTPATIENT
Start: 2019-07-02 | End: 2019-07-02

## 2019-07-02 RX ORDER — SODIUM CHLORIDE 0.9 % (FLUSH) 0.9 %
10 SYRINGE (ML) INJECTION AS NEEDED
Status: DISCONTINUED | OUTPATIENT
Start: 2019-07-02 | End: 2019-07-03 | Stop reason: HOSPADM

## 2019-07-02 RX ORDER — CETIRIZINE HYDROCHLORIDE 10 MG/1
10 TABLET ORAL DAILY
Qty: 10 TABLET | Refills: 0 | Status: ON HOLD | OUTPATIENT
Start: 2019-07-02 | End: 2021-12-21

## 2019-07-02 RX ORDER — FLUTICASONE PROPIONATE 50 MCG
2 SPRAY, SUSPENSION (ML) NASAL DAILY
Qty: 16 G | Refills: 0 | Status: ON HOLD | OUTPATIENT
Start: 2019-07-02 | End: 2021-12-21

## 2019-07-02 RX ORDER — SODIUM CHLORIDE 9 MG/ML
125 INJECTION, SOLUTION INTRAVENOUS CONTINUOUS
Status: DISCONTINUED | OUTPATIENT
Start: 2019-07-02 | End: 2019-07-03 | Stop reason: HOSPADM

## 2019-07-02 RX ADMIN — ACETAMINOPHEN 1000 MG: 500 TABLET ORAL at 20:31

## 2019-07-02 RX ADMIN — SODIUM CHLORIDE 125 ML/HR: 9 INJECTION, SOLUTION INTRAVENOUS at 20:30

## 2019-07-02 RX ADMIN — SODIUM CHLORIDE 500 ML: 9 INJECTION, SOLUTION INTRAVENOUS at 20:31

## 2019-07-03 NOTE — ED NOTES
Pt presents to ED via EMS. States she went to the clinic for a sore throat and was sent here after an EKG showed possible MI. Pt is tachycardic but denies chest pain or shortness of breath. Will continue monitoring.      Zion Valentine RN  07/02/19 2040       Zion Valentine RN  07/02/19 2040

## 2019-07-03 NOTE — ED PROVIDER NOTES
Subjective   Pt sent from Urgent Care by ambulance for abnormal EKG.  Pt went  To urgent care for feeling bad and sore throat.  Onset today.  While at urgent care they performed and EKG.  They determined it was abnormal and sent her to ER by ambulance.        Sore Throat   Location:  Generalized  Quality:  Burning  Severity:  Moderate  Duration: today.  Timing:  Constant  Progression:  Unchanged  Chronicity:  New  Relieved by:  Nothing  Worsened by:  Swallowing  Ineffective treatments:  None tried  Associated symptoms: trouble swallowing    Associated symptoms: no abdominal pain, no adenopathy, no chest pain, no chills, no cough, no drooling, no ear discharge, no ear pain, no epistaxis, no eye discharge, no fever, no headaches, no neck stiffness, no night sweats, no plugged ear sensation, no postnasal drip, no rash, no rhinorrhea, no shortness of breath, no sinus congestion, no stridor and no voice change    Risk factors: no exposure to strep, no exposure to mono, no sick contacts, no recent dental procedure, no recent endoscopy and no recent ENT procedure        Review of Systems   Constitutional: Negative.  Negative for chills, fever and night sweats.   HENT: Positive for congestion, sore throat and trouble swallowing. Negative for drooling, ear discharge, ear pain, nosebleeds, postnasal drip, rhinorrhea, sinus pressure, sinus pain and voice change.    Eyes: Negative.  Negative for discharge.   Respiratory: Negative.  Negative for cough, chest tightness, shortness of breath and stridor.    Cardiovascular: Negative.  Negative for chest pain, palpitations and leg swelling.   Gastrointestinal: Negative.  Negative for abdominal pain.   Endocrine: Negative.    Genitourinary: Negative.    Musculoskeletal: Negative for neck stiffness.   Skin: Negative.  Negative for rash.   Allergic/Immunologic: Negative.    Neurological: Negative.  Negative for headaches.   Hematological: Negative.  Negative for adenopathy.    Psychiatric/Behavioral: Negative.    All other systems reviewed and are negative.      No past medical history on file.    No Known Allergies    No past surgical history on file.    Family History   Problem Relation Age of Onset   • No Known Problems Father    • No Known Problems Mother        Social History     Socioeconomic History   • Marital status:      Spouse name: Not on file   • Number of children: Not on file   • Years of education: Not on file   • Highest education level: Not on file   Tobacco Use   • Smoking status: Never Smoker   • Smokeless tobacco: Never Used   Substance and Sexual Activity   • Alcohol use: No   • Drug use: No   • Sexual activity: Yes     Partners: Male           Objective   Physical Exam   Constitutional: She is oriented to person, place, and time. She appears well-developed and well-nourished. No distress.   HENT:   Head: Normocephalic and atraumatic.   Right Ear: External ear normal.   Left Ear: External ear normal.   Nose: Nose normal.   Pharyngeal erythema without exudate.  Airway patent   Eyes: Conjunctivae and EOM are normal. Pupils are equal, round, and reactive to light. Right eye exhibits no discharge. Left eye exhibits no discharge. No scleral icterus.   Neck: Normal range of motion. Neck supple. No tracheal deviation present.   Minimal non-tender submandibular LAD   Cardiovascular: Regular rhythm, normal heart sounds and intact distal pulses. Exam reveals no gallop and no friction rub.   No murmur heard.  Regular tachycardia   Pulmonary/Chest: Effort normal and breath sounds normal. No stridor. No respiratory distress. She has no wheezes. She has no rales.   Abdominal: Soft. Bowel sounds are normal. She exhibits no distension and no mass. There is no tenderness. There is no guarding.   Musculoskeletal: Normal range of motion. She exhibits edema. She exhibits no tenderness or deformity.   Neurological: She is alert and oriented to person, place, and time. No sensory  deficit. She exhibits normal muscle tone.   Skin: Skin is warm and dry. Capillary refill takes less than 2 seconds. She is not diaphoretic. No erythema. No pallor.   Psychiatric: She has a normal mood and affect. Her behavior is normal. Judgment and thought content normal.   Nursing note and vitals reviewed.      Procedures           ED Course  ED Course as of Jul 02 2234   Tue Jul 02, 2019   2229 Twelve-lead EKG performed at 2018 hrs.  Interpreted by me at 2020 hrs.  Sinus tachycardia.  Ventricular rate 109.  MS interval 148.  QRS duration 70.  .  QTc 406.  No pathologic blocks.  No sustained ectopy or dysrhythmia.  No acute ischemic ST segment elevation.  No pathologic T wave changes.  No overt criteria for acute infarct/STEMI. ECG 12 Lead [TZ]   2231 Patient asymptomatic for any cardiac complaints.  She has only a sore throat.  Her heart rate is improving.  She has a normal EKG and troponin.  [TZ]      ED Course User Index  [TZ] Chad Rob MD      No orders to display     Labs Reviewed   COMPREHENSIVE METABOLIC PANEL - Abnormal; Notable for the following components:       Result Value    CO2 19.4 (*)     Anion Gap 15.6 (*)     All other components within normal limits    Narrative:     GFR Normal >60  Chronic Kidney Disease <60  Kidney Failure <15   CBC WITH AUTO DIFFERENTIAL - Abnormal; Notable for the following components:    RDW 12.2 (*)     Neutrophil % 86.4 (*)     Lymphocyte % 5.7 (*)     Eosinophil % 0.0 (*)     Neutrophils, Absolute 7.09 (*)     Lymphocytes, Absolute 0.47 (*)     All other components within normal limits   RAPID STREP A SCREEN - Normal   MAGNESIUM - Normal   T4, FREE - Normal   TSH - Normal   TROPONIN (IN-HOUSE) - Normal    Narrative:     Troponin T Reference Range:  <= 0.03 ng/mL-   Negative for AMI  >0.03 ng/mL-     Abnormal for myocardial necrosis.  Clinicians would have to utilize clinical acumen, EKG, Troponin and serial changes to determine if it is an Acute  Myocardial Infarction or myocardial injury due to an underlying chronic condition.    MONONUCLEOSIS SCREEN - Normal   BETA HEMOLYTIC STREP CULTURE, THROAT   CBC AND DIFFERENTIAL    Narrative:     The following orders were created for panel order CBC & Differential.  Procedure                               Abnormality         Status                     ---------                               -----------         ------                     CBC Auto Differential[[id number]]        Abnormal            Final result                 Please view results for these tests on the individual orders.        Medication List      START taking these medications    acetaminophen 500 MG tablet  Commonly known as:  TYLENOL  Take 1 tablet by mouth Every 6 (Six) Hours As Needed for Mild Pain ,   Moderate Pain  or Fever.     cetirizine 10 MG tablet  Commonly known as:  zyrTEC  Take 1 tablet by mouth Daily.     fluticasone 50 MCG/ACT nasal spray  Commonly known as:  FLONASE  2 sprays into the nostril(s) as directed by provider Daily.        CHANGE how you take these medications    * ibuprofen 600 MG tablet  Commonly known as:  ADVIL,MOTRIN  Take 1 tablet by mouth Every 8 (Eight) Hours.  What changed:  Another medication with the same name was added. Make sure   you understand how and when to take each.     * ibuprofen 600 MG tablet  Commonly known as:  ADVIL,MOTRIN  Take 1 tablet by mouth Every 6 (Six) Hours As Needed for Mild Pain ,   Moderate Pain  or Fever.  What changed:  You were already taking a medication with the same name,   and this prescription was added. Make sure you understand how and when to   take each.         * This list has 2 medication(s) that are the same as other medications   prescribed for you. Read the directions carefully, and ask your doctor or   other care provider to review them with you.            CONTINUE taking these medications    DOCQLACE 100 MG capsule  Generic drug:  docusate sodium  Take 1 capsule by  mouth 2 (Two) Times a Day.     ferrous sulfate 325 (65 FE) MG EC tablet  Take 1 tablet by mouth 2 (Two) Times a Day With Meals.     magnesium hydroxide 2400 MG/10ML suspension suspension  Commonly known as:  MILK OF MAGNESIA  Take 10 mL by mouth Daily As Needed for constipation.     prenatal vitamin 27-0.8 27-0.8 MG tablet tablet                    MDM  Number of Diagnoses or Management Options  Acute pharyngitis, unspecified etiology: new and requires workup  Sinus tachycardia: new and requires workup     Amount and/or Complexity of Data Reviewed  Clinical lab tests: ordered and reviewed    Risk of Complications, Morbidity, and/or Mortality  Presenting problems: moderate  Diagnostic procedures: moderate  Management options: moderate    Patient Progress  Patient progress: stable        Final diagnoses:   Acute pharyngitis, unspecified etiology   Sinus tachycardia            Chad Rob MD  07/02/19 [id number]

## 2019-07-04 LAB — BACTERIA SPEC AEROBE CULT: NORMAL

## 2021-08-25 LAB
EXTERNAL ABO GROUPING: NORMAL
EXTERNAL ANTIBODY SCREEN: NEGATIVE
EXTERNAL HEPATITIS B SURFACE ANTIGEN: NEGATIVE
EXTERNAL RH FACTOR: POSITIVE
EXTERNAL RUBELLA QUALITATIVE: NORMAL
EXTERNAL SYPHILIS RPR SCREEN: NORMAL
HIV1 P24 AG SERPL QL IA: NORMAL

## 2021-12-21 ENCOUNTER — HOSPITAL ENCOUNTER (OUTPATIENT)
Facility: HOSPITAL | Age: 26
Discharge: HOME | End: 2021-12-21
Attending: OBSTETRICS & GYNECOLOGY | Admitting: OBSTETRICS & GYNECOLOGY

## 2021-12-21 ENCOUNTER — HOSPITAL ENCOUNTER (OUTPATIENT)
Facility: HOSPITAL | Age: 26
End: 2021-12-21
Attending: OBSTETRICS & GYNECOLOGY | Admitting: OBSTETRICS & GYNECOLOGY

## 2021-12-21 VITALS
SYSTOLIC BLOOD PRESSURE: 121 MMHG | RESPIRATION RATE: 18 BRPM | BODY MASS INDEX: 22.78 KG/M2 | WEIGHT: 113 LBS | HEIGHT: 59 IN | TEMPERATURE: 97.9 F | HEART RATE: 100 BPM | DIASTOLIC BLOOD PRESSURE: 78 MMHG | OXYGEN SATURATION: 100 %

## 2021-12-21 PROBLEM — O47.9 UTERINE CONTRACTIONS DURING PREGNANCY: Status: ACTIVE | Noted: 2021-12-21

## 2021-12-21 LAB
ALBUMIN SERPL-MCNC: 3.56 G/DL (ref 3.5–5.2)
ALBUMIN/GLOB SERPL: 1.1 G/DL
ALP SERPL-CCNC: 85 U/L (ref 39–117)
ALT SERPL W P-5'-P-CCNC: 8 U/L (ref 1–33)
ANION GAP SERPL CALCULATED.3IONS-SCNC: 10.8 MMOL/L (ref 5–15)
AST SERPL-CCNC: 16 U/L (ref 1–32)
BACTERIA UR QL AUTO: ABNORMAL /HPF
BASOPHILS # BLD AUTO: 0.02 10*3/MM3 (ref 0–0.2)
BASOPHILS NFR BLD AUTO: 0.2 % (ref 0–1.5)
BILIRUB SERPL-MCNC: 0.4 MG/DL (ref 0–1.2)
BILIRUB UR QL STRIP: NEGATIVE
BUN SERPL-MCNC: 3 MG/DL (ref 6–20)
BUN/CREAT SERPL: 6.8 (ref 7–25)
CALCIUM SPEC-SCNC: 8.9 MG/DL (ref 8.6–10.5)
CHLORIDE SERPL-SCNC: 103 MMOL/L (ref 98–107)
CLARITY UR: CLEAR
CO2 SERPL-SCNC: 19.2 MMOL/L (ref 22–29)
COLOR UR: YELLOW
CREAT SERPL-MCNC: 0.44 MG/DL (ref 0.57–1)
DEPRECATED RDW RBC AUTO: 37.9 FL (ref 37–54)
EOSINOPHIL # BLD AUTO: 0.03 10*3/MM3 (ref 0–0.4)
EOSINOPHIL NFR BLD AUTO: 0.3 % (ref 0.3–6.2)
ERYTHROCYTE [DISTWIDTH] IN BLOOD BY AUTOMATED COUNT: 12.2 % (ref 12.3–15.4)
GFR SERPL CREATININE-BSD FRML MDRD: >150 ML/MIN/1.73
GLOBULIN UR ELPH-MCNC: 3.1 GM/DL
GLUCOSE SERPL-MCNC: 88 MG/DL (ref 65–99)
GLUCOSE UR STRIP-MCNC: NEGATIVE MG/DL
HCT VFR BLD AUTO: 30.5 % (ref 34–46.6)
HGB BLD-MCNC: 9.9 G/DL (ref 12–15.9)
HGB UR QL STRIP.AUTO: NEGATIVE
HYALINE CASTS UR QL AUTO: ABNORMAL /LPF
IMM GRANULOCYTES # BLD AUTO: 0.06 10*3/MM3 (ref 0–0.05)
IMM GRANULOCYTES NFR BLD AUTO: 0.6 % (ref 0–0.5)
KETONES UR QL STRIP: NEGATIVE
LDH SERPL-CCNC: 178 U/L (ref 135–214)
LEUKOCYTE ESTERASE UR QL STRIP.AUTO: ABNORMAL
LYMPHOCYTES # BLD AUTO: 1.11 10*3/MM3 (ref 0.7–3.1)
LYMPHOCYTES NFR BLD AUTO: 11.3 % (ref 19.6–45.3)
MCH RBC QN AUTO: 27.7 PG (ref 26.6–33)
MCHC RBC AUTO-ENTMCNC: 32.5 G/DL (ref 31.5–35.7)
MCV RBC AUTO: 85.4 FL (ref 79–97)
MONOCYTES # BLD AUTO: 0.51 10*3/MM3 (ref 0.1–0.9)
MONOCYTES NFR BLD AUTO: 5.2 % (ref 5–12)
NEUTROPHILS NFR BLD AUTO: 8.09 10*3/MM3 (ref 1.7–7)
NEUTROPHILS NFR BLD AUTO: 82.4 % (ref 42.7–76)
NITRITE UR QL STRIP: NEGATIVE
NRBC BLD AUTO-RTO: 0 /100 WBC (ref 0–0.2)
PH UR STRIP.AUTO: 6.5 [PH] (ref 5–8)
PLATELET # BLD AUTO: 324 10*3/MM3 (ref 140–450)
PMV BLD AUTO: 9.8 FL (ref 6–12)
POTASSIUM SERPL-SCNC: 3.4 MMOL/L (ref 3.5–5.2)
PROT SERPL-MCNC: 6.7 G/DL (ref 6–8.5)
PROT UR QL STRIP: NEGATIVE
RBC # BLD AUTO: 3.57 10*6/MM3 (ref 3.77–5.28)
RBC # UR STRIP: ABNORMAL /HPF
REF LAB TEST METHOD: ABNORMAL
SODIUM SERPL-SCNC: 133 MMOL/L (ref 136–145)
SP GR UR STRIP: 1.01 (ref 1–1.03)
SQUAMOUS #/AREA URNS HPF: ABNORMAL /HPF
URATE SERPL-MCNC: 2.8 MG/DL (ref 2.4–5.7)
UROBILINOGEN UR QL STRIP: ABNORMAL
WBC # UR STRIP: ABNORMAL /HPF
WBC NRBC COR # BLD: 9.82 10*3/MM3 (ref 3.4–10.8)

## 2021-12-21 PROCEDURE — P9612 CATHETERIZE FOR URINE SPEC: HCPCS

## 2021-12-21 PROCEDURE — G0463 HOSPITAL OUTPT CLINIC VISIT: HCPCS

## 2021-12-21 PROCEDURE — 83615 LACTATE (LD) (LDH) ENZYME: CPT | Performed by: OBSTETRICS & GYNECOLOGY

## 2021-12-21 PROCEDURE — 59025 FETAL NON-STRESS TEST: CPT

## 2021-12-21 PROCEDURE — 85025 COMPLETE CBC W/AUTO DIFF WBC: CPT | Performed by: OBSTETRICS & GYNECOLOGY

## 2021-12-21 PROCEDURE — 87086 URINE CULTURE/COLONY COUNT: CPT | Performed by: OBSTETRICS & GYNECOLOGY

## 2021-12-21 PROCEDURE — 36415 COLL VENOUS BLD VENIPUNCTURE: CPT | Performed by: OBSTETRICS & GYNECOLOGY

## 2021-12-21 PROCEDURE — 80053 COMPREHEN METABOLIC PANEL: CPT | Performed by: OBSTETRICS & GYNECOLOGY

## 2021-12-21 PROCEDURE — 25010000002 TERBUTALINE PER 1 MG: Performed by: OBSTETRICS & GYNECOLOGY

## 2021-12-21 PROCEDURE — 81001 URINALYSIS AUTO W/SCOPE: CPT | Performed by: OBSTETRICS & GYNECOLOGY

## 2021-12-21 PROCEDURE — 84550 ASSAY OF BLOOD/URIC ACID: CPT | Performed by: OBSTETRICS & GYNECOLOGY

## 2021-12-21 PROCEDURE — 96372 THER/PROPH/DIAG INJ SC/IM: CPT

## 2021-12-21 RX ORDER — NIFEDIPINE 10 MG/1
10 CAPSULE ORAL ONCE
Status: COMPLETED | OUTPATIENT
Start: 2021-12-21 | End: 2021-12-21

## 2021-12-21 RX ORDER — POTASSIUM CHLORIDE 20 MEQ/1
20 TABLET, EXTENDED RELEASE ORAL ONCE
Status: COMPLETED | OUTPATIENT
Start: 2021-12-21 | End: 2021-12-21

## 2021-12-21 RX ORDER — TERBUTALINE SULFATE 1 MG/ML
0.25 INJECTION, SOLUTION SUBCUTANEOUS ONCE
Status: COMPLETED | OUTPATIENT
Start: 2021-12-21 | End: 2021-12-21

## 2021-12-21 RX ADMIN — TERBUTALINE SULFATE 0.25 MG: 1 INJECTION SUBCUTANEOUS at 16:19

## 2021-12-21 RX ADMIN — POTASSIUM CHLORIDE 20 MEQ: 1500 TABLET, EXTENDED RELEASE ORAL at 17:00

## 2021-12-21 RX ADMIN — NIFEDIPINE 10 MG: 10 CAPSULE ORAL at 15:18

## 2021-12-21 NOTE — NON STRESS TEST
Bing Sterling, a  at 27w6d with an CLEMENTE of 3/16/2022, by Patient Reported, was seen at [facility] LABOR DELIVERY for a nonstress test.    Chief Complaint   Patient presents with   • Contractions       Patient Active Problem List   Diagnosis   • Uterine contractions during pregnancy       Start Time:   Stop Time:     Interpretation A  Nonstress Test Interpretation A: Reactive (21 1520 : Donna Motta, RN)  Comments A: verified by gila pretty rn (21 1520 : Donna Motta, RN)

## 2021-12-22 LAB — BACTERIA SPEC AEROBE CULT: NO GROWTH

## 2021-12-23 ENCOUNTER — TELEPHONE (OUTPATIENT)
Dept: OBSTETRICS AND GYNECOLOGY | Facility: HOSPITAL | Age: 26
End: 2021-12-23

## 2021-12-23 NOTE — TELEPHONE ENCOUNTER
Maternal Child Initial Intake    Patient Name: Bing Sterling     Preferred Name: Bing     YOB: 1995     Patient E-mail Address: None given       Patient gives permission for information/resources to be emailed: no    Currently Employed: Yes, describe: [employer]    How well do you speak English? Very Well     Services: No    Language Spoken/Preferred English    Willingness to participate in [employer]: yes    Home Phone: [phone]  Cell Phone: [phone]  Alternate/Work/School Phone: none  Best Time for Contacting: anytime  Best Method for Contacting: Home  May we contact you by phone: yes  May we contact you by mail: yes  Highest Level of Education to Date: High school diploma    Professional Contacts  Type of Provider Name Next Appointment   OB/GYN Provider:     Primary Care Provider:       Dental Provider:     Speciality Provider:      Pediatrician Chosen         Have you seen dentist in last 6 months: [] YES    [] NO    Other Providers/Services Presently Utilizing:   WIC (Women, Infant, Children)    Pregnancy Confirmed: Yes  No LMP recorded. Patient is pregnant. No LMP recorded. Patient is pregnant.   CLEMENTE: 03/16/2022 Based Upon Other  Feeding Plan: [] Breast [] Bottle  Current Pregnancy Related Symptoms, Concerns, or Questions: none  No Known Allergies   Prior to Admission medications    Medication Sig Start Date End Date Taking? Authorizing Provider   acetaminophen (TYLENOL) 500 MG tablet Take 1 tablet by mouth Every 6 (Six) Hours As Needed for Mild Pain , Moderate Pain  or Fever. 7/2/19   Chad Rob MD   ferrous sulfate 325 (65 FE) MG EC tablet Take 1 tablet by mouth 2 (Two) Times a Day With Meals. 8/25/17   Francois Philippe PA   Prenatal Vit-Fe Fumarate-FA (PRENATAL VITAMIN 27-0.8) 27-0.8 MG tablet tablet Take  by mouth Daily.    Provider, MD Fco      OB/GYN Specific History:   None    No LMP recorded. Patient is pregnant.   Estimated Date of Delivery:  3/16/22   Pregnancy History:   Social History:   Sexually Active: Yes Partners: Male  Birth Control/Protection Post Delivery: undecided    Pregnancy History:  Current Pregnancy Baby Sex: unknonwn  Date: GA (wks) Birth Wt Sex Del Type Place of Del Comments/Complications Living (Y/N)                                                                           No past medical history on file.   No past surgical history on file.   Implants: none  Family History   Problem Relation Age of Onset   • No Known Problems Father    • No Known Problems Mother         Social History:  Smoking Status: Never a smoker.  Cigarettes   Passive Exposure: yes  Counseling Given: yes  Smokeless Tobacco: Never   Alcohol Use: No   Drinks/wk: 0   Substance Use History: No  Substances Used & Use/Wk: 0    [facility] Domestic Violence/Abuse Screening  Within the last year, have you been:  Humiliated or emotionally abused in other ways by your partner or ex-partner no  Afraid of your partner or ex-partner no  Raped or forced to have any kind of sexual activity by your partner or ex-partner no  Kicked, hit, slapped, or otherwise physically hurt by your partner or ex-partner no  Feels Unsafe at home or work/school: no  Feels Threatened by Someone no  Does anyone try to keep you from having contact with others/doing things outside your home: no  History of physical, mental, emotional, financial abuse/neglect: no    Spiritual, Cultural, Religous, Value Awareness  Any Spiritual, Cultural, or Presybeterian Beliefs/Practices/Values that we need to be mindful of throughout your maternity experience: no    Resource/Environmental Concerns:  Current Living Arrangement: home/apt/condo  Resource/Environmental Concerns:   None    Disability/Function:  Do you have:  Difficulty Hearing or Deaf: yes  Difficulty Seeing or Blind: yes  Difficulty Concentrating or with Decision-Making: yes  Difficulty Communicating: yes  Difficulty with Comprehension/Understanding of  Information Provided: yes  Difficulty with Performing Activities of Daily Living: yes    Accommodations/Assistive Devices Utilized (e.g. hearing aids, sign language, braille, service animal, /aide, etc.): none    Equipment Presently Utilized/Available at Home: Other none  Education:   Preferred Language for Discussing Healthcare: English  Ability to Read: yes  Ability to Write: yes    Preferred learning method: listening, reading and demonstration  Learning Barriers: none  Topic Education Information Comments        Hospital Education Programs [] Provided                         [] Acknowledged                [] Refused    [facility] Maternal-Child Department [x] Provided                         [] Acknowledged                [] Refused    Signs or Symptoms to Report [x] Provided                         [] Acknowledged                [] Refused    Other: Instructions for [employer] [place]. [x] Provided                         [] Acknowledged                [] Refused    Comments:       Significant Other/Support Person: Name:                        Relationship:   Do you have [employer]?  [] YES    [x] NO   Specific Resources Provided and Method: How to find a pediatrician, Insurance Benefits/Incentives, Smoking/Vaping Sensation, WIC Benefits Sent via: Discussed with patient during phone conversation.    Do you have the [employer] [place]?  [] YES    [x] NO     Intake Summary   [x] Intake completed, will follow-up with patient:   [x] Discussed community resources and information provided or assisted with appointments (see notes)  [] Other, including any provider notifications (see notes)  [] Declines [employer] Participation  Apoorva Turcios RN   Maternal Nurse Navigator

## 2021-12-30 ENCOUNTER — HOSPITAL ENCOUNTER (EMERGENCY)
Facility: HOSPITAL | Age: 26
Discharge: HOME | End: 2021-12-30
Attending: EMERGENCY MEDICINE | Admitting: EMERGENCY MEDICINE

## 2021-12-30 ENCOUNTER — APPOINTMENT (OUTPATIENT)
Dept: GENERAL RADIOLOGY | Facility: HOSPITAL | Age: 26
End: 2021-12-30

## 2021-12-30 VITALS
BODY MASS INDEX: 23.18 KG/M2 | RESPIRATION RATE: 14 BRPM | HEART RATE: 103 BPM | SYSTOLIC BLOOD PRESSURE: 112 MMHG | HEIGHT: 59 IN | DIASTOLIC BLOOD PRESSURE: 74 MMHG | TEMPERATURE: 98.7 F | WEIGHT: 115 LBS | OXYGEN SATURATION: 98 %

## 2021-12-30 DIAGNOSIS — U07.1 COVID-19: ICD-10-CM

## 2021-12-30 DIAGNOSIS — J18.9 PNEUMONIA OF LEFT LOWER LOBE DUE TO INFECTIOUS ORGANISM: Primary | ICD-10-CM

## 2021-12-30 PROCEDURE — 99282 EMERGENCY DEPT VISIT SF MDM: CPT

## 2021-12-30 PROCEDURE — M0245 HC IV INFUSION, BAMLANIVIMAB AND ETESEVIMAB, 2100 MG: HCPCS | Performed by: PHYSICIAN ASSISTANT

## 2021-12-30 PROCEDURE — 71045 X-RAY EXAM CHEST 1 VIEW: CPT | Performed by: RADIOLOGY

## 2021-12-30 PROCEDURE — 25010000002 INJECTION, BAMLANIVIMAB AND ETESEVIMAB, 2100 MG: Performed by: PHYSICIAN ASSISTANT

## 2021-12-30 PROCEDURE — 71045 X-RAY EXAM CHEST 1 VIEW: CPT

## 2021-12-30 RX ORDER — SODIUM CHLORIDE 0.9 % (FLUSH) 0.9 %
10 SYRINGE (ML) INJECTION AS NEEDED
Status: DISCONTINUED | OUTPATIENT
Start: 2021-12-30 | End: 2021-12-30 | Stop reason: HOSPADM

## 2021-12-30 RX ORDER — EPINEPHRINE 1 MG/ML
0.3 INJECTION, SOLUTION INTRAMUSCULAR; SUBCUTANEOUS ONCE AS NEEDED
Status: DISCONTINUED | OUTPATIENT
Start: 2021-12-30 | End: 2021-12-30 | Stop reason: HOSPADM

## 2021-12-30 RX ORDER — DIPHENHYDRAMINE HCL 50 MG
50 CAPSULE ORAL ONCE AS NEEDED
Status: DISCONTINUED | OUTPATIENT
Start: 2021-12-30 | End: 2021-12-30 | Stop reason: HOSPADM

## 2021-12-30 RX ORDER — DIPHENHYDRAMINE HYDROCHLORIDE 50 MG/ML
50 INJECTION INTRAMUSCULAR; INTRAVENOUS ONCE AS NEEDED
Status: DISCONTINUED | OUTPATIENT
Start: 2021-12-30 | End: 2021-12-30 | Stop reason: HOSPADM

## 2021-12-30 RX ORDER — AMOXICILLIN AND CLAVULANATE POTASSIUM 875; 125 MG/1; MG/1
1 TABLET, FILM COATED ORAL 2 TIMES DAILY
Qty: 20 TABLET | Refills: 0 | Status: ON HOLD | OUTPATIENT
Start: 2021-12-30 | End: 2022-02-28

## 2021-12-30 RX ORDER — METHYLPREDNISOLONE SODIUM SUCCINATE 125 MG/2ML
125 INJECTION, POWDER, LYOPHILIZED, FOR SOLUTION INTRAMUSCULAR; INTRAVENOUS ONCE AS NEEDED
Status: DISCONTINUED | OUTPATIENT
Start: 2021-12-30 | End: 2021-12-30 | Stop reason: HOSPADM

## 2021-12-30 RX ORDER — SODIUM CHLORIDE 9 MG/ML
30 INJECTION, SOLUTION INTRAVENOUS ONCE
Status: COMPLETED | OUTPATIENT
Start: 2021-12-30 | End: 2021-12-30

## 2021-12-30 RX ADMIN — SODIUM CHLORIDE 30 ML: 9 INJECTION, SOLUTION INTRAVENOUS at 18:35

## 2021-12-30 RX ADMIN — SODIUM CHLORIDE: 9 INJECTION, SOLUTION INTRAVENOUS at 18:15

## 2022-02-11 LAB — EXTERNAL GROUP B STREP ANTIGEN: NEGATIVE

## 2022-02-27 ENCOUNTER — HOSPITAL ENCOUNTER (INPATIENT)
Facility: HOSPITAL | Age: 27
LOS: 2 days | Discharge: HOME | End: 2022-03-02
Attending: OBSTETRICS & GYNECOLOGY | Admitting: OBSTETRICS & GYNECOLOGY

## 2022-02-27 PROBLEM — Z34.90 PREGNANT: Status: ACTIVE | Noted: 2022-02-27

## 2022-02-27 LAB
ABO GROUP BLD: NORMAL
ALBUMIN SERPL-MCNC: 3.3 G/DL (ref 3.5–5.2)
ALBUMIN/GLOB SERPL: 1 G/DL
ALP SERPL-CCNC: 176 U/L (ref 39–117)
ALT SERPL W P-5'-P-CCNC: <5 U/L (ref 1–33)
AMPHET+METHAMPHET UR QL: NEGATIVE
AMPHETAMINES UR QL: NEGATIVE
ANION GAP SERPL CALCULATED.3IONS-SCNC: 13.5 MMOL/L (ref 5–15)
AST SERPL-CCNC: 20 U/L (ref 1–32)
BARBITURATES UR QL SCN: NEGATIVE
BASOPHILS # BLD AUTO: 0.03 10*3/MM3 (ref 0–0.2)
BASOPHILS NFR BLD AUTO: 0.4 % (ref 0–1.5)
BENZODIAZ UR QL SCN: NEGATIVE
BILIRUB SERPL-MCNC: 0.4 MG/DL (ref 0–1.2)
BLD GP AB SCN SERPL QL: NEGATIVE
BUN SERPL-MCNC: 8 MG/DL (ref 6–20)
BUN/CREAT SERPL: 14.8 (ref 7–25)
BUPRENORPHINE SERPL-MCNC: NEGATIVE NG/ML
CALCIUM SPEC-SCNC: 9.1 MG/DL (ref 8.6–10.5)
CANNABINOIDS SERPL QL: NEGATIVE
CHLORIDE SERPL-SCNC: 105 MMOL/L (ref 98–107)
CO2 SERPL-SCNC: 17.5 MMOL/L (ref 22–29)
COCAINE UR QL: NEGATIVE
CREAT SERPL-MCNC: 0.54 MG/DL (ref 0.57–1)
DEPRECATED RDW RBC AUTO: 41.3 FL (ref 37–54)
EOSINOPHIL # BLD AUTO: 0.03 10*3/MM3 (ref 0–0.4)
EOSINOPHIL NFR BLD AUTO: 0.4 % (ref 0.3–6.2)
ERYTHROCYTE [DISTWIDTH] IN BLOOD BY AUTOMATED COUNT: 14.9 % (ref 12.3–15.4)
GFR SERPL CREATININE-BSD FRML MDRD: 139 ML/MIN/1.73
GLOBULIN UR ELPH-MCNC: 3.3 GM/DL
GLUCOSE SERPL-MCNC: 86 MG/DL (ref 65–99)
HCT VFR BLD AUTO: 29.3 % (ref 34–46.6)
HGB BLD-MCNC: 9.1 G/DL (ref 12–15.9)
IMM GRANULOCYTES # BLD AUTO: 0.06 10*3/MM3 (ref 0–0.05)
IMM GRANULOCYTES NFR BLD AUTO: 0.7 % (ref 0–0.5)
LDH SERPL-CCNC: 186 U/L (ref 135–214)
LYMPHOCYTES # BLD AUTO: 2.36 10*3/MM3 (ref 0.7–3.1)
LYMPHOCYTES NFR BLD AUTO: 28.8 % (ref 19.6–45.3)
MCH RBC QN AUTO: 23.9 PG (ref 26.6–33)
MCHC RBC AUTO-ENTMCNC: 31.1 G/DL (ref 31.5–35.7)
MCV RBC AUTO: 76.9 FL (ref 79–97)
METHADONE UR QL SCN: NEGATIVE
MONOCYTES # BLD AUTO: 0.58 10*3/MM3 (ref 0.1–0.9)
MONOCYTES NFR BLD AUTO: 7.1 % (ref 5–12)
NEUTROPHILS NFR BLD AUTO: 5.13 10*3/MM3 (ref 1.7–7)
NEUTROPHILS NFR BLD AUTO: 62.6 % (ref 42.7–76)
NRBC BLD AUTO-RTO: 0 /100 WBC (ref 0–0.2)
OPIATES UR QL: NEGATIVE
OXYCODONE UR QL SCN: NEGATIVE
PCP UR QL SCN: NEGATIVE
PLATELET # BLD AUTO: 337 10*3/MM3 (ref 140–450)
PMV BLD AUTO: 10.7 FL (ref 6–12)
POTASSIUM SERPL-SCNC: 3.8 MMOL/L (ref 3.5–5.2)
PROPOXYPH UR QL: NEGATIVE
PROT SERPL-MCNC: 6.6 G/DL (ref 6–8.5)
RBC # BLD AUTO: 3.81 10*6/MM3 (ref 3.77–5.28)
RH BLD: POSITIVE
SODIUM SERPL-SCNC: 136 MMOL/L (ref 136–145)
T&S EXPIRATION DATE: NORMAL
TRICYCLICS UR QL SCN: NEGATIVE
URATE SERPL-MCNC: 4.4 MG/DL (ref 2.4–5.7)
WBC NRBC COR # BLD: 8.19 10*3/MM3 (ref 3.4–10.8)

## 2022-02-27 PROCEDURE — 80053 COMPREHEN METABOLIC PANEL: CPT | Performed by: OBSTETRICS & GYNECOLOGY

## 2022-02-27 PROCEDURE — 86850 RBC ANTIBODY SCREEN: CPT | Performed by: OBSTETRICS & GYNECOLOGY

## 2022-02-27 PROCEDURE — 59025 FETAL NON-STRESS TEST: CPT

## 2022-02-27 PROCEDURE — G0463 HOSPITAL OUTPT CLINIC VISIT: HCPCS

## 2022-02-27 PROCEDURE — 36415 COLL VENOUS BLD VENIPUNCTURE: CPT | Performed by: OBSTETRICS & GYNECOLOGY

## 2022-02-27 PROCEDURE — 86900 BLOOD TYPING SEROLOGIC ABO: CPT | Performed by: OBSTETRICS & GYNECOLOGY

## 2022-02-27 PROCEDURE — 85025 COMPLETE CBC W/AUTO DIFF WBC: CPT | Performed by: OBSTETRICS & GYNECOLOGY

## 2022-02-27 PROCEDURE — 84550 ASSAY OF BLOOD/URIC ACID: CPT | Performed by: OBSTETRICS & GYNECOLOGY

## 2022-02-27 PROCEDURE — 86901 BLOOD TYPING SEROLOGIC RH(D): CPT | Performed by: OBSTETRICS & GYNECOLOGY

## 2022-02-27 PROCEDURE — 80306 DRUG TEST PRSMV INSTRMNT: CPT | Performed by: OBSTETRICS & GYNECOLOGY

## 2022-02-27 PROCEDURE — 83615 LACTATE (LD) (LDH) ENZYME: CPT | Performed by: OBSTETRICS & GYNECOLOGY

## 2022-02-27 RX ORDER — SODIUM CHLORIDE 0.9 % (FLUSH) 0.9 %
10 SYRINGE (ML) INJECTION AS NEEDED
Status: DISCONTINUED | OUTPATIENT
Start: 2022-02-27 | End: 2022-02-28 | Stop reason: HOSPADM

## 2022-02-27 RX ORDER — SODIUM CHLORIDE 0.9 % (FLUSH) 0.9 %
3 SYRINGE (ML) INJECTION EVERY 12 HOURS SCHEDULED
Status: DISCONTINUED | OUTPATIENT
Start: 2022-02-27 | End: 2022-02-28 | Stop reason: HOSPADM

## 2022-02-27 RX ORDER — SODIUM CHLORIDE, SODIUM LACTATE, POTASSIUM CHLORIDE, CALCIUM CHLORIDE 600; 310; 30; 20 MG/100ML; MG/100ML; MG/100ML; MG/100ML
125 INJECTION, SOLUTION INTRAVENOUS CONTINUOUS
Status: DISCONTINUED | OUTPATIENT
Start: 2022-02-27 | End: 2022-03-02 | Stop reason: HOSPADM

## 2022-02-27 RX ORDER — LIDOCAINE HYDROCHLORIDE 10 MG/ML
5 INJECTION, SOLUTION EPIDURAL; INFILTRATION; INTRACAUDAL; PERINEURAL AS NEEDED
Status: DISCONTINUED | OUTPATIENT
Start: 2022-02-27 | End: 2022-02-28 | Stop reason: HOSPADM

## 2022-02-27 RX ADMIN — SODIUM CHLORIDE, POTASSIUM CHLORIDE, SODIUM LACTATE AND CALCIUM CHLORIDE 125 ML/HR: 600; 310; 30; 20 INJECTION, SOLUTION INTRAVENOUS at 21:44

## 2022-02-28 ENCOUNTER — ANESTHESIA EVENT (OUTPATIENT)
Dept: LABOR AND DELIVERY | Facility: HOSPITAL | Age: 27
End: 2022-02-28

## 2022-02-28 ENCOUNTER — ANESTHESIA (OUTPATIENT)
Dept: LABOR AND DELIVERY | Facility: HOSPITAL | Age: 27
End: 2022-02-28

## 2022-02-28 PROBLEM — Z34.90 PREGNANCY: Status: ACTIVE | Noted: 2022-02-28

## 2022-02-28 PROCEDURE — C1755 CATHETER, INTRASPINAL: HCPCS

## 2022-02-28 PROCEDURE — C1755 CATHETER, INTRASPINAL: HCPCS | Performed by: NURSE ANESTHETIST, CERTIFIED REGISTERED

## 2022-02-28 PROCEDURE — 0KQM0ZZ REPAIR PERINEUM MUSCLE, OPEN APPROACH: ICD-10-PCS | Performed by: OBSTETRICS & GYNECOLOGY

## 2022-02-28 PROCEDURE — 59025 FETAL NON-STRESS TEST: CPT

## 2022-02-28 PROCEDURE — 25010000002 FENTANYL CITRATE (PF) 50 MCG/ML SOLUTION: Performed by: NURSE ANESTHETIST, CERTIFIED REGISTERED

## 2022-02-28 PROCEDURE — 0 LIDOCAINE 1 % SOLUTION: Performed by: NURSE ANESTHETIST, CERTIFIED REGISTERED

## 2022-02-28 RX ORDER — PRENATAL VIT/IRON FUM/FOLIC AC 27MG-0.8MG
1 TABLET ORAL DAILY
Status: DISCONTINUED | OUTPATIENT
Start: 2022-02-28 | End: 2022-03-02 | Stop reason: HOSPADM

## 2022-02-28 RX ORDER — FAMOTIDINE 10 MG/ML
20 INJECTION, SOLUTION INTRAVENOUS ONCE AS NEEDED
Status: DISCONTINUED | OUTPATIENT
Start: 2022-02-28 | End: 2022-02-28 | Stop reason: HOSPADM

## 2022-02-28 RX ORDER — OXYTOCIN-SODIUM CHLORIDE 0.9% IV SOLN 30 UNIT/500ML 30-0.9/5 UT/ML-%
2-20 SOLUTION INTRAVENOUS
Status: DISCONTINUED | OUTPATIENT
Start: 2022-02-28 | End: 2022-02-28 | Stop reason: HOSPADM

## 2022-02-28 RX ORDER — BUTORPHANOL TARTRATE 1 MG/ML
1 INJECTION, SOLUTION INTRAMUSCULAR; INTRAVENOUS
Status: DISCONTINUED | OUTPATIENT
Start: 2022-02-28 | End: 2022-02-28 | Stop reason: HOSPADM

## 2022-02-28 RX ORDER — METHYLERGONOVINE MALEATE 0.2 MG/ML
200 INJECTION INTRAVENOUS ONCE AS NEEDED
Status: DISCONTINUED | OUTPATIENT
Start: 2022-02-28 | End: 2022-02-28 | Stop reason: HOSPADM

## 2022-02-28 RX ORDER — TERBUTALINE SULFATE 1 MG/ML
0.25 INJECTION, SOLUTION SUBCUTANEOUS AS NEEDED
Status: DISCONTINUED | OUTPATIENT
Start: 2022-02-28 | End: 2022-02-28 | Stop reason: HOSPADM

## 2022-02-28 RX ORDER — CARBOPROST TROMETHAMINE 250 UG/ML
250 INJECTION, SOLUTION INTRAMUSCULAR ONCE AS NEEDED
Status: DISCONTINUED | OUTPATIENT
Start: 2022-02-28 | End: 2022-03-02 | Stop reason: HOSPADM

## 2022-02-28 RX ORDER — FENTANYL CIT 0.2 MG/100ML-ROPIV 0.2%-NACL 0.9% EPIDURAL INJ 2/0.2 MCG/ML-%
10 SOLUTION INJECTION CONTINUOUS
Status: DISCONTINUED | OUTPATIENT
Start: 2022-02-28 | End: 2022-03-01

## 2022-02-28 RX ORDER — OXYTOCIN-SODIUM CHLORIDE 0.9% IV SOLN 30 UNIT/500ML 30-0.9/5 UT/ML-%
125 SOLUTION INTRAVENOUS CONTINUOUS PRN
Status: DISCONTINUED | OUTPATIENT
Start: 2022-02-28 | End: 2022-02-28 | Stop reason: HOSPADM

## 2022-02-28 RX ORDER — IBUPROFEN 800 MG/1
800 TABLET ORAL EVERY 8 HOURS SCHEDULED
Status: DISCONTINUED | OUTPATIENT
Start: 2022-02-28 | End: 2022-02-28 | Stop reason: HOSPADM

## 2022-02-28 RX ORDER — FENTANYL CITRATE 50 UG/ML
100 INJECTION, SOLUTION INTRAMUSCULAR; INTRAVENOUS ONCE
Status: COMPLETED | OUTPATIENT
Start: 2022-02-28 | End: 2022-02-28

## 2022-02-28 RX ORDER — BISACODYL 10 MG
10 SUPPOSITORY, RECTAL RECTAL DAILY PRN
Status: DISCONTINUED | OUTPATIENT
Start: 2022-03-01 | End: 2022-03-02 | Stop reason: HOSPADM

## 2022-02-28 RX ORDER — FERROUS SULFATE 325(65) MG
325 TABLET ORAL DAILY
Status: DISCONTINUED | OUTPATIENT
Start: 2022-02-28 | End: 2022-03-01

## 2022-02-28 RX ORDER — DOCUSATE SODIUM 100 MG/1
100 CAPSULE, LIQUID FILLED ORAL 2 TIMES DAILY
Status: DISCONTINUED | OUTPATIENT
Start: 2022-02-28 | End: 2022-03-02 | Stop reason: HOSPADM

## 2022-02-28 RX ORDER — IBUPROFEN 800 MG/1
800 TABLET ORAL EVERY 8 HOURS SCHEDULED
Status: DISCONTINUED | OUTPATIENT
Start: 2022-02-28 | End: 2022-03-02 | Stop reason: HOSPADM

## 2022-02-28 RX ORDER — PYRIDOXINE HCL (VITAMIN B6) 25 MG
25 TABLET ORAL 2 TIMES DAILY
Status: ON HOLD | COMMUNITY
Start: 2021-08-25 | End: 2022-02-28

## 2022-02-28 RX ORDER — SODIUM CHLORIDE 0.9 % (FLUSH) 0.9 %
1-10 SYRINGE (ML) INJECTION AS NEEDED
Status: DISCONTINUED | OUTPATIENT
Start: 2022-02-28 | End: 2022-03-02 | Stop reason: HOSPADM

## 2022-02-28 RX ORDER — OXYTOCIN-SODIUM CHLORIDE 0.9% IV SOLN 30 UNIT/500ML 30-0.9/5 UT/ML-%
SOLUTION INTRAVENOUS
Status: COMPLETED
Start: 2022-02-28 | End: 2022-02-28

## 2022-02-28 RX ORDER — ONDANSETRON 2 MG/ML
4 INJECTION INTRAMUSCULAR; INTRAVENOUS ONCE AS NEEDED
Status: DISCONTINUED | OUTPATIENT
Start: 2022-02-28 | End: 2022-02-28 | Stop reason: HOSPADM

## 2022-02-28 RX ORDER — LIDOCAINE HYDROCHLORIDE 20 MG/ML
INJECTION, SOLUTION EPIDURAL; INFILTRATION; INTRACAUDAL; PERINEURAL
Status: COMPLETED
Start: 2022-02-28 | End: 2022-02-28

## 2022-02-28 RX ORDER — ONDANSETRON 2 MG/ML
4 INJECTION INTRAMUSCULAR; INTRAVENOUS EVERY 6 HOURS PRN
Status: DISCONTINUED | OUTPATIENT
Start: 2022-02-28 | End: 2022-03-02 | Stop reason: HOSPADM

## 2022-02-28 RX ORDER — ACETAMINOPHEN 325 MG/1
650 TABLET ORAL EVERY 4 HOURS PRN
Status: DISCONTINUED | OUTPATIENT
Start: 2022-02-28 | End: 2022-02-28 | Stop reason: HOSPADM

## 2022-02-28 RX ORDER — LIDOCAINE HYDROCHLORIDE 10 MG/ML
INJECTION, SOLUTION INFILTRATION; PERINEURAL AS NEEDED
Status: DISCONTINUED | OUTPATIENT
Start: 2022-02-28 | End: 2022-02-28 | Stop reason: SURG

## 2022-02-28 RX ORDER — EPHEDRINE SULFATE 50 MG/ML
10 INJECTION, SOLUTION INTRAVENOUS
Status: DISCONTINUED | OUTPATIENT
Start: 2022-02-28 | End: 2022-02-28 | Stop reason: HOSPADM

## 2022-02-28 RX ORDER — OXYTOCIN-SODIUM CHLORIDE 0.9% IV SOLN 30 UNIT/500ML 30-0.9/5 UT/ML-%
999 SOLUTION INTRAVENOUS ONCE
Status: COMPLETED | OUTPATIENT
Start: 2022-02-28 | End: 2022-02-28

## 2022-02-28 RX ORDER — ONDANSETRON 4 MG/1
4 TABLET, FILM COATED ORAL EVERY 6 HOURS PRN
Status: DISCONTINUED | OUTPATIENT
Start: 2022-02-28 | End: 2022-02-28 | Stop reason: HOSPADM

## 2022-02-28 RX ORDER — CARBOPROST TROMETHAMINE 250 UG/ML
250 INJECTION, SOLUTION INTRAMUSCULAR AS NEEDED
Status: DISCONTINUED | OUTPATIENT
Start: 2022-02-28 | End: 2022-02-28 | Stop reason: HOSPADM

## 2022-02-28 RX ORDER — ONDANSETRON 2 MG/ML
4 INJECTION INTRAMUSCULAR; INTRAVENOUS EVERY 6 HOURS PRN
Status: DISCONTINUED | OUTPATIENT
Start: 2022-02-28 | End: 2022-02-28 | Stop reason: HOSPADM

## 2022-02-28 RX ORDER — OXYTOCIN-SODIUM CHLORIDE 0.9% IV SOLN 30 UNIT/500ML 30-0.9/5 UT/ML-%
250 SOLUTION INTRAVENOUS CONTINUOUS
Status: ACTIVE | OUTPATIENT
Start: 2022-02-28 | End: 2022-02-28

## 2022-02-28 RX ORDER — METHYLERGONOVINE MALEATE 0.2 MG/ML
200 INJECTION INTRAVENOUS ONCE AS NEEDED
Status: DISCONTINUED | OUTPATIENT
Start: 2022-02-28 | End: 2022-03-02 | Stop reason: HOSPADM

## 2022-02-28 RX ORDER — HYDROCODONE BITARTRATE AND ACETAMINOPHEN 7.5; 325 MG/1; MG/1
1 TABLET ORAL EVERY 4 HOURS PRN
Status: DISCONTINUED | OUTPATIENT
Start: 2022-02-28 | End: 2022-02-28 | Stop reason: HOSPADM

## 2022-02-28 RX ORDER — MISOPROSTOL 100 UG/1
600 TABLET ORAL ONCE AS NEEDED
Status: DISCONTINUED | OUTPATIENT
Start: 2022-02-28 | End: 2022-03-02 | Stop reason: HOSPADM

## 2022-02-28 RX ORDER — MISOPROSTOL 100 UG/1
600 TABLET ORAL AS NEEDED
Status: DISCONTINUED | OUTPATIENT
Start: 2022-02-28 | End: 2022-02-28 | Stop reason: HOSPADM

## 2022-02-28 RX ORDER — FERROUS SULFATE 325(65) MG
325 TABLET ORAL DAILY
COMMUNITY

## 2022-02-28 RX ORDER — PRENATAL VIT/IRON FUM/FOLIC AC 27MG-0.8MG
1 TABLET ORAL DAILY
Status: CANCELLED | OUTPATIENT
Start: 2022-02-28

## 2022-02-28 RX ORDER — HYDROCODONE BITARTRATE AND ACETAMINOPHEN 5; 325 MG/1; MG/1
1 TABLET ORAL EVERY 4 HOURS PRN
Status: DISCONTINUED | OUTPATIENT
Start: 2022-02-28 | End: 2022-03-02 | Stop reason: HOSPADM

## 2022-02-28 RX ORDER — DIPHENHYDRAMINE HCL 25 MG
25 CAPSULE ORAL NIGHTLY PRN
Status: DISCONTINUED | OUTPATIENT
Start: 2022-02-28 | End: 2022-03-02 | Stop reason: HOSPADM

## 2022-02-28 RX ORDER — LIDOCAINE HYDROCHLORIDE 20 MG/ML
INJECTION, SOLUTION EPIDURAL; INFILTRATION; INTRACAUDAL; PERINEURAL AS NEEDED
Status: DISCONTINUED | OUTPATIENT
Start: 2022-02-28 | End: 2022-02-28 | Stop reason: SURG

## 2022-02-28 RX ORDER — DOXYLAMINE SUCCINATE 25 MG/1
25 TABLET ORAL 2 TIMES DAILY
Status: ON HOLD | COMMUNITY
Start: 2021-08-25 | End: 2022-02-28

## 2022-02-28 RX ORDER — MAGNESIUM HYDROXIDE 1200 MG/15ML
1000 LIQUID ORAL ONCE AS NEEDED
Status: DISCONTINUED | OUTPATIENT
Start: 2022-02-28 | End: 2022-02-28 | Stop reason: HOSPADM

## 2022-02-28 RX ORDER — LIDOCAINE HYDROCHLORIDE AND EPINEPHRINE 15; 5 MG/ML; UG/ML
INJECTION, SOLUTION EPIDURAL AS NEEDED
Status: DISCONTINUED | OUTPATIENT
Start: 2022-02-28 | End: 2022-02-28 | Stop reason: SURG

## 2022-02-28 RX ORDER — HYDROCORTISONE 25 MG/G
1 CREAM TOPICAL AS NEEDED
Status: DISCONTINUED | OUTPATIENT
Start: 2022-02-28 | End: 2022-03-02 | Stop reason: HOSPADM

## 2022-02-28 RX ORDER — ONDANSETRON 4 MG/1
4 TABLET, FILM COATED ORAL EVERY 6 HOURS PRN
Status: DISCONTINUED | OUTPATIENT
Start: 2022-02-28 | End: 2022-03-02 | Stop reason: HOSPADM

## 2022-02-28 RX ADMIN — FENTANYL CIT 0.2 MG/100ML-ROPIV 0.2%-NACL 0.9% EPIDURAL INJ 10 ML/HR: 2/0.2 SOLUTION at 11:36

## 2022-02-28 RX ADMIN — IBUPROFEN 800 MG: 800 TABLET, FILM COATED ORAL at 21:02

## 2022-02-28 RX ADMIN — OXYTOCIN 250 ML/HR: 10 INJECTION, SOLUTION INTRAMUSCULAR; INTRAVENOUS at 15:45

## 2022-02-28 RX ADMIN — IBUPROFEN 800 MG: 800 TABLET, FILM COATED ORAL at 16:59

## 2022-02-28 RX ADMIN — DOCUSATE SODIUM 100 MG: 100 CAPSULE ORAL at 21:02

## 2022-02-28 RX ADMIN — FERROUS SULFATE TAB 325 MG (65 MG ELEMENTAL FE) 325 MG: 325 (65 FE) TAB at 19:33

## 2022-02-28 RX ADMIN — FENTANYL CITRATE 100 MCG: 50 INJECTION, SOLUTION INTRAMUSCULAR; INTRAVENOUS at 11:33

## 2022-02-28 RX ADMIN — LIDOCAINE HYDROCHLORIDE 3 ML: 10 INJECTION, SOLUTION INFILTRATION; PERINEURAL at 11:27

## 2022-02-28 RX ADMIN — SODIUM CHLORIDE, POTASSIUM CHLORIDE, SODIUM LACTATE AND CALCIUM CHLORIDE 125 ML/HR: 600; 310; 30; 20 INJECTION, SOLUTION INTRAVENOUS at 05:39

## 2022-02-28 RX ADMIN — HYDROCODONE BITARTRATE AND ACETAMINOPHEN 1 TABLET: 5; 325 TABLET ORAL at 19:34

## 2022-02-28 RX ADMIN — LIDOCAINE HYDROCHLORIDE AND EPINEPHRINE 3 ML: 15; 5 INJECTION, SOLUTION EPIDURAL at 11:30

## 2022-02-28 RX ADMIN — EPHEDRINE SULFATE 10 MG: 50 INJECTION INTRAVENOUS at 11:49

## 2022-02-28 RX ADMIN — LIDOCAINE HYDROCHLORIDE 5 ML: 20 INJECTION, SOLUTION EPIDURAL; INFILTRATION; INTRACAUDAL; PERINEURAL at 11:33

## 2022-02-28 RX ADMIN — OXYTOCIN 999 ML/HR: 10 INJECTION, SOLUTION INTRAMUSCULAR; INTRAVENOUS at 15:06

## 2022-02-28 RX ADMIN — OXYTOCIN-SODIUM CHLORIDE 0.9% IV SOLN 30 UNIT/500ML 2 MILLI-UNITS/MIN: 30-0.9/5 SOLUTION at 13:42

## 2022-02-28 RX ADMIN — MISOPROSTOL 600 MCG: 100 TABLET ORAL at 15:12

## 2022-02-28 RX ADMIN — OXYTOCIN 2 MILLI-UNITS/MIN: 10 INJECTION, SOLUTION INTRAMUSCULAR; INTRAVENOUS at 13:42

## 2022-02-28 RX ADMIN — SODIUM CHLORIDE, POTASSIUM CHLORIDE, SODIUM LACTATE AND CALCIUM CHLORIDE 1000 ML: 600; 310; 30; 20 INJECTION, SOLUTION INTRAVENOUS at 11:00

## 2022-03-01 LAB
BASOPHILS # BLD AUTO: 0.04 10*3/MM3 (ref 0–0.2)
BASOPHILS NFR BLD AUTO: 0.5 % (ref 0–1.5)
DEPRECATED RDW RBC AUTO: 42.2 FL (ref 37–54)
EOSINOPHIL # BLD AUTO: 0.06 10*3/MM3 (ref 0–0.4)
EOSINOPHIL NFR BLD AUTO: 0.7 % (ref 0.3–6.2)
ERYTHROCYTE [DISTWIDTH] IN BLOOD BY AUTOMATED COUNT: 14.9 % (ref 12.3–15.4)
HCT VFR BLD AUTO: 27.8 % (ref 34–46.6)
HGB BLD-MCNC: 8.4 G/DL (ref 12–15.9)
IMM GRANULOCYTES # BLD AUTO: 0.08 10*3/MM3 (ref 0–0.05)
IMM GRANULOCYTES NFR BLD AUTO: 0.9 % (ref 0–0.5)
LYMPHOCYTES # BLD AUTO: 2.51 10*3/MM3 (ref 0.7–3.1)
LYMPHOCYTES NFR BLD AUTO: 28.4 % (ref 19.6–45.3)
MCH RBC QN AUTO: 23.7 PG (ref 26.6–33)
MCHC RBC AUTO-ENTMCNC: 30.2 G/DL (ref 31.5–35.7)
MCV RBC AUTO: 78.3 FL (ref 79–97)
MONOCYTES # BLD AUTO: 0.64 10*3/MM3 (ref 0.1–0.9)
MONOCYTES NFR BLD AUTO: 7.2 % (ref 5–12)
NEUTROPHILS NFR BLD AUTO: 5.51 10*3/MM3 (ref 1.7–7)
NEUTROPHILS NFR BLD AUTO: 62.3 % (ref 42.7–76)
NRBC BLD AUTO-RTO: 0 /100 WBC (ref 0–0.2)
PLATELET # BLD AUTO: 253 10*3/MM3 (ref 140–450)
PMV BLD AUTO: 11.1 FL (ref 6–12)
RBC # BLD AUTO: 3.55 10*6/MM3 (ref 3.77–5.28)
WBC NRBC COR # BLD: 8.84 10*3/MM3 (ref 3.4–10.8)

## 2022-03-01 PROCEDURE — 85025 COMPLETE CBC W/AUTO DIFF WBC: CPT | Performed by: OBSTETRICS & GYNECOLOGY

## 2022-03-01 RX ORDER — FERROUS SULFATE 325(65) MG
325 TABLET ORAL 2 TIMES DAILY WITH MEALS
Status: DISCONTINUED | OUTPATIENT
Start: 2022-03-01 | End: 2022-03-02 | Stop reason: HOSPADM

## 2022-03-01 RX ADMIN — DOCUSATE SODIUM 100 MG: 100 CAPSULE ORAL at 21:23

## 2022-03-01 RX ADMIN — IBUPROFEN 800 MG: 800 TABLET, FILM COATED ORAL at 21:23

## 2022-03-01 RX ADMIN — FERROUS SULFATE TAB 325 MG (65 MG ELEMENTAL FE) 325 MG: 325 (65 FE) TAB at 09:00

## 2022-03-01 RX ADMIN — PRENATAL VIT W/ FE FUMARATE-FA TAB 27-0.8 MG 1 TABLET: 27-0.8 TAB at 08:58

## 2022-03-01 RX ADMIN — DOCUSATE SODIUM 100 MG: 100 CAPSULE ORAL at 08:59

## 2022-03-01 RX ADMIN — IBUPROFEN 800 MG: 800 TABLET, FILM COATED ORAL at 13:43

## 2022-03-01 RX ADMIN — IBUPROFEN 800 MG: 800 TABLET, FILM COATED ORAL at 05:11

## 2022-03-01 NOTE — PLAN OF CARE
Goal Outcome Evaluation:  Pt up ab lashawn, voiding frequently/well, minor pain noted, Pt coping well.

## 2022-03-01 NOTE — PROGRESS NOTES
" [facility]  Vaginal Delivery Progress Note    Subjective   Postpartum Day 1: Vaginal Delivery c/b GHTN    Patient feels well. Is ambulating and voiding without issue. Tolerating PO with no nausea/vomiting. Denies CP/SOB. Lochia WNL.  Denies HA/VC/RUQ pain/CP/SOB. Denies anxiety/depression.     Objective     Vital Signs Range for the last 24 hours  Temperature: Temp:  [97.8 °F (36.6 °C)-98.6 °F (37 °C)] 97.8 °F (36.6 °C)   Temp Source: Temp src: Oral   BP: BP: ()/(36-93) 120/81   Pulse: Heart Rate:  [] 65   Respirations: Resp:  [16-18] 18   SPO2: SpO2:  [97 %-100 %] 100 %   O2 Amount (l/min):     O2 Devices Device (Oxygen Therapy): room air   Weight:       Admit Height:  Height: 149.9 cm (59\")      Physical Exam:  General:  no acute distresss.  Cardiovascular: regular rate and rhythm  Respiratory: clear to auscultation bilaterally   Abdomen: abdomen is soft without significant tenderness, masses, organomegaly or guarding. Fundus: appropriate, firm, non tender  Extremities: normal, atraumatic, no cyanosis, and trace edema.       Lab results reviewed:  Yes       Assessment/Plan     Normal postpartum state      Plan:  Continue current care.   Hb 8.4: Fe ordered   Bottle Feeding   Undecided on contraception.    Anticipate discharge on PPD#2      Mini Anderson MD  3/1/2022  08:54 EST    "

## 2022-03-01 NOTE — NURSING NOTE
Maternal Child Initial Intake    Patient Name: Bing Sterling     Preferred Name: Bing     YOB: 1995     Patient E-mail Address: none given                 Patient gives permission for information/resources to be emailed: no    Currently Employed: Yes, describe: [employer]    How well do you speak English? Very Well     Services: No    Language Spoken/Preferred English    Willingness to participate in [employer]: yes    Home Phone: [phone]  Cell Phone: [phone]  Alternate/Work/School Phone: none  Best Time for Contacting: anytime  Best Method for Contacting: Home  May we contact you by phone: yes  May we contact you by mail: yes  Highest Level of Education to Date: High school diploma    Professional Contacts  Type of Provider Name Next Appointment   OB/GYN Provider:     Primary Care Provider:       Dental Provider:     Speciality Provider:      Pediatrician Chosen         Have you seen dentist in last 6 months: [] YES    [] NO    Other Providers/Services Presently Utilizing:   WIC (Women, Infant, Children)    Pregnancy Confirmed: Yes  No LMP recorded. No LMP recorded.   CLEMENTE: 2022 Based Upon Other  Feeding Plan: [] Breast [x] Bottle  Current Pregnancy Related Symptoms, Concerns, or Questions: none voiced  No Known Allergies   Prior to Admission medications    Medication Sig Start Date End Date Taking? Authorizing Provider   ferrous sulfate 325 (65 FE) MG tablet Take 325 mg by mouth Daily.   Yes Fco Gutierrez MD   Prenatal Vit-Fe Fumarate-FA (PRENATAL VITAMIN 27-0.8) 27-0.8 MG tablet tablet Take 1 tablet by mouth Daily.   Yes Fco Gutierrez MD      OB/GYN Specific History:   GHTN    No LMP recorded.   Estimated Date of Delivery: 3/16/22   Pregnancy History:   Social History:   Sexually Active: Yes Partners: Male  Birth Control/Protection Post Delivery: undecided    Pregnancy History:  Current Pregnancy Baby Sex: Female  Date: GA (wks) Birth Wt Sex Del  Type Place of Del Comments/Complications Living (Y/N)                                                                           Past Medical History:   Diagnosis Date   • Gestational hypertension       History reviewed. No pertinent surgical history.   Implants: none  Family History   Problem Relation Age of Onset   • No Known Problems Father    • No Known Problems Mother         Social History:  Smoking Status: Never a smoker.  Cigarettes   Passive Exposure: no  Counseling Given: no  Smokeless Tobacco: Never   Alcohol Use: No   Drinks/wk: 0   Substance Use History: No  Substances Used & Use/Wk: 0    [facility] Domestic Violence/Abuse Screening  Within the last year, have you been:  Humiliated or emotionally abused in other ways by your partner or ex-partner no  Afraid of your partner or ex-partner no  Raped or forced to have any kind of sexual activity by your partner or ex-partner no  Kicked, hit, slapped, or otherwise physically hurt by your partner or ex-partner no  Feels Unsafe at home or work/school: no  Feels Threatened by Someone no  Does anyone try to keep you from having contact with others/doing things outside your home: no  History of physical, mental, emotional, financial abuse/neglect: no    Spiritual, Cultural, Religous, Value Awareness  Any Spiritual, Cultural, or Mandaen Beliefs/Practices/Values that we need to be mindful of throughout your maternity experience: no    Resource/Environmental Concerns:  Current Living Arrangement: home/apt/condo  Resource/Environmental Concerns:   None    Disability/Function:  Do you have:  Difficulty Hearing or Deaf: no  Difficulty Seeing or Blind: no  Difficulty Concentrating or with Decision-Making: no  Difficulty Communicating: no  Difficulty with Comprehension/Understanding of Information Provided: no  Difficulty with Performing Activities of Daily Living: no    Accommodations/Assistive Devices Utilized (e.g. hearing aids, sign language, braille, service animal,  /aide, etc.): none    Equipment Presently Utilized/Available at Home: Other none  Education:   Preferred Language for Discussing Healthcare: English  Ability to Read: yes  Ability to Write: yes    Preferred learning method: listening and reading  Learning Barriers: none  Topic Education Information Comments        Hospital Education Programs [x] Provided                         [] Acknowledged                [] Refused    [facility] Maternal-Child Department [x] Provided                         [] Acknowledged                [] Refused    Signs or Symptoms to Report [x] Provided                         [] Acknowledged                [] Refused    Other: [employer] [place] registration [] Provided                         [] Acknowledged                [] Refused    Comments:       Significant Other/Support Person: Name:                        Relationship:   Do you have [employer]?  [] YES    [x] NO   Specific Resources Provided and Method: HANDS, How to find a dentist, How to find a pediatrician, How to find a primary care provider, How to sign up for MyChart, Insurance Benefits/Incentives, Meds to Beds, Mental Health Services , Smoking/Vaping Sensation, SNAP Benefits, Substance Use Disorder Treatment, Transportation Assistance , WIC Benefits Sent via: Handouts Given    Do you have the [employer] [place]?  [] YES    [x] NO     Intake Summary   [x] Intake completed, will follow-up with patient:   [x] Discussed community resources and information provided or assisted with appointments (see notes)  [] Other, including any provider notifications (see notes)  [] Declines [employer] Participation  Apoorva Turcios RN   Maternal Nurse Navigator

## 2022-03-01 NOTE — PLAN OF CARE
Goal Outcome Evaluation:  Plan of Care Reviewed With: patient        Progress: improving   Patient ambulating in room and to bathroom. Voids spontaneously with no difficulty. Fundus is firm and midline. Light bleeding noted. PRN pain medication given once during shift. Patient states no current needs or concerns.

## 2022-03-01 NOTE — PAYOR COMM NOTE
"CONTACT:  LUIS DAILEY MSN, APRN  UTILIZATION MANAGEMENT DEPT.  [facility]  [address] [facility], [address]  PHONE:  [phone]  FAX: [phone]    INPATIENT AUTHORIZATION REQUEST    ICD 10 CODES: Z34.90, O80  [facility] NPI: [id number]  DR. AVELINO YEBOAH NPI: [id number]    Bing Sterling (26 y.o. Female)             Date of Birth Social Security Number Address Home Phone MRN    1995  [address]  [place] [address] [phone] [id number]    Episcopalian Marital Status             None        Admission Date Admission Type Admitting Provider Attending Provider Department, Room/Bed    2/28/22 Elective Avelino Yeboah, Avelino Vaz,  [facility], W243/1    Discharge Date Discharge Disposition Discharge Destination                         Attending Provider: Avelino Yeboah DO    Allergies: No Known Allergies    Isolation: None   Infection: None   Code Status: CPR   Advance Care Planning Activity    Ht: 149.9 cm (59\")   Wt: 53.4 kg (117 lb 11.2 oz)    Admission Cmt: None   Principal Problem: None                Active Insurance as of 2/27/2022     Primary Coverage     Payor Plan Insurance Group Employer/Plan Group    ANTHEM BLUE CROSS ANTHEM BLUE CROSS BLUE SHIELD PPO [id number]     Payor Plan Address Payor Plan Phone Number Payor Plan Fax Number Effective Dates    [address] [phone]  1/1/2021 - None Entered    [facility] [id number]       Subscriber Name Subscriber Birth Date Member ID       BING STERLING 1995 [id number]           Secondary Coverage     Payor Plan Insurance Group Employer/Plan Group    ANTHEM MEDICAID ANTHEM MEDICAID [id number]     Payor Plan Address Payor Plan Phone Number Payor Plan Fax Number Effective Dates    [address] [address] [phone]  1/1/2017 - None Entered    [facility] [address]       Subscriber Name Subscriber Birth Date Member ID       BING STERLING 1995 [id number]           " "      Emergency Contacts      (Rel.) Home Phone Work Phone Mobile Phone    Daren Sterling (Spouse) [phone] -- --        History:  Estimated Date of Delivery: 22 Gestational Age: 37w5d     Well baby girl in  nursery  Vaginal Delivery on 22 @ 1503  Apgars 8/9, weight 2725 grams         History & Physical      H&P signed by [employer], [place] at 22 1103      Scan on 2022 by [employer], [place]: [facility], [employer], 2022          Electronically signed by [employer], [place] at 22 1103       Operative/Procedure Notes (all)    No notes of this type exist for this encounter.            Physician Progress Notes (all)      Mini Anderson MD at 22 0854           Florentin  Vaginal Delivery Progress Note    Subjective   Postpartum Day 1: Vaginal Delivery c/b GHTN    Patient feels well. Is ambulating and voiding without issue. Tolerating PO with no nausea/vomiting. Denies CP/SOB. Lochia WNL.  Denies HA/VC/RUQ pain/CP/SOB. Denies anxiety/depression.     Objective     Vital Signs Range for the last 24 hours  Temperature: Temp:  [97.8 °F (36.6 °C)-98.6 °F (37 °C)] 97.8 °F (36.6 °C)   Temp Source: Temp src: Oral   BP: BP: ()/(36-93) 120/81   Pulse: Heart Rate:  [] 65   Respirations: Resp:  [16-18] 18   SPO2: SpO2:  [97 %-100 %] 100 %   O2 Amount (l/min):     O2 Devices Device (Oxygen Therapy): room air   Weight:       Admit Height:  Height: 149.9 cm (59\")      Physical Exam:  General:  no acute distresss.  Cardiovascular: regular rate and rhythm  Respiratory: clear to auscultation bilaterally   Abdomen: abdomen is soft without significant tenderness, masses, organomegaly or guarding. Fundus: appropriate, firm, non tender  Extremities: normal, atraumatic, no cyanosis, and trace edema.       Lab results reviewed:  Yes       Assessment/Plan     Normal postpartum state      Plan:  Continue current care.   Hb 8.4: Fe ordered   Bottle " Feeding   Undecided on contraception.    Anticipate discharge on PPD#2      Mini Anderson MD  3/1/2022  08:54 EST      Electronically signed by Mini Anderson MD at 03/01/22 [id number]

## 2022-03-02 VITALS
OXYGEN SATURATION: 100 % | DIASTOLIC BLOOD PRESSURE: 81 MMHG | TEMPERATURE: 98.2 F | RESPIRATION RATE: 18 BRPM | HEIGHT: 59 IN | HEART RATE: 65 BPM | WEIGHT: 117.7 LBS | BODY MASS INDEX: 23.73 KG/M2 | SYSTOLIC BLOOD PRESSURE: 133 MMHG

## 2022-03-02 RX ORDER — IBUPROFEN 600 MG/1
600 TABLET ORAL EVERY 6 HOURS PRN
Qty: 30 TABLET | Refills: 0 | Status: SHIPPED | OUTPATIENT
Start: 2022-03-02 | End: 2022-04-01

## 2022-03-02 RX ADMIN — DOCUSATE SODIUM 100 MG: 100 CAPSULE ORAL at 08:41

## 2022-03-02 RX ADMIN — FERROUS SULFATE TAB 325 MG (65 MG ELEMENTAL FE) 325 MG: 325 (65 FE) TAB at 08:41

## 2022-03-02 RX ADMIN — PRENATAL VIT W/ FE FUMARATE-FA TAB 27-0.8 MG 1 TABLET: 27-0.8 TAB at 08:41

## 2022-03-02 RX ADMIN — IBUPROFEN 800 MG: 800 TABLET, FILM COATED ORAL at 13:18

## 2022-03-02 NOTE — PROGRESS NOTES
" Florentin  Vaginal Delivery Progress Note    Subjective   Postpartum Day 2: Vaginal Delivery    The patient feels well.  Denies complaints.  Lochia is light.      Objective     Vital Signs Range for the last 24 hours  Temperature: Temp:  [97.7 °F (36.5 °C)-98.3 °F (36.8 °C)] 97.7 °F (36.5 °C)   Temp Source: Temp src: Oral   BP: BP: (123-132)/(76-85) 132/85   Pulse: Heart Rate:  [54-71] 54   Respirations: Resp:  [16] 16   SPO2: SpO2:  [99 %-100 %] 99 %   O2 Amount (l/min):     O2 Devices Device (Oxygen Therapy): room air   Weight:       Admit Height:  Height: 149.9 cm (59\")      Physical Exam:  General:  no acute distresss.  Abdomen: Fundus: appropriate, firm, non tender  Extremities: normal, atraumatic, no cyanosis, and trace edema.       Lab results reviewed:  Yes       Assessment/Plan     Normal postpartum state      Plan:  Discharge home with standard precautions and return to clinic in 4-6 weeks.      Markos Bearden DO  3/2/2022  11:41 EST  "

## 2022-03-02 NOTE — PLAN OF CARE
Goal Outcome Evaluation:  Plan of Care Reviewed With: patient, spouse        Progress: improving  Outcome Summary: urinating without difficulty  small rubra/  vitals stable  voices confidence in ability to care for self at home

## 2022-03-02 NOTE — PLAN OF CARE
Goal Outcome Evaluation:               VSS. Patient doing well. Will continue to monitor.  Problem: Adult Inpatient Plan of Care  Goal: Plan of Care Review  Outcome: Ongoing, Progressing  Goal: Patient-Specific Goal (Individualized)  Outcome: Ongoing, Progressing  Goal: Absence of Hospital-Acquired Illness or Injury  Outcome: Ongoing, Progressing  Intervention: Identify and Manage Fall Risk  Recent Flowsheet Documentation  Taken 3/2/2022 0230 by Vy Jimenez RN  Safety Promotion/Fall Prevention: safety round/check completed  Taken 3/2/2022 0130 by Vy Jimenez RN  Safety Promotion/Fall Prevention: safety round/check completed  Taken 3/2/2022 0030 by Vy Jimenez RN  Safety Promotion/Fall Prevention: safety round/check completed  Taken 3/1/2022 2330 by Vy Jimenez RN  Safety Promotion/Fall Prevention: safety round/check completed  Taken 3/1/2022 2230 by Vy Jimenez RN  Safety Promotion/Fall Prevention: safety round/check completed  Goal: Optimal Comfort and Wellbeing  Outcome: Ongoing, Progressing  Goal: Readiness for Transition of Care  Outcome: Ongoing, Progressing     Problem: Adjustment to Role Transition (Postpartum Vaginal Delivery)  Goal: Successful Maternal Role Transition  Outcome: Ongoing, Progressing     Problem: Bleeding (Postpartum Vaginal Delivery)  Goal: Hemostasis  Outcome: Ongoing, Progressing     Problem: Infection (Postpartum Vaginal Delivery)  Goal: Absence of Infection Signs and Symptoms  Outcome: Ongoing, Progressing     Problem: Pain (Postpartum Vaginal Delivery)  Goal: Acceptable Pain Control  Outcome: Ongoing, Progressing     Problem: Urinary Retention (Postpartum Vaginal Delivery)  Goal: Effective Urinary Elimination  Outcome: Ongoing, Progressing

## 2022-03-02 NOTE — PAYOR COMM NOTE
"CONTACT:  LUIS DAILEY MSN, APRN  UTILIZATION MANAGEMENT DEPT.  [facility]  [address]  [facility], [address]  PHONE:  [phone]  FAX: [phone]    PATIENT DISCHARGED TO HOME ON 3/2/22, AWAITING AUTHORIZATION    Bing Sterling (26 y.o. Female)             Date of Birth Social Security Number Address Home Phone MRN    1995  [address]  [place] [address] [phone] [id number]    Quaker Marital Status             None        Admission Date Admission Type Admitting Provider Attending Provider Department, Room/Bed    2/27/22 Elective Markos Bearden, [facility], W243/1    Discharge Date Discharge Disposition Discharge Destination          3/2/2022 Home or Self Care              Attending Provider: (none)   Allergies: No Known Allergies    Isolation: None   Infection: None   Code Status: CPR   Advance Care Planning Activity    Ht: 149.9 cm (59\")   Wt: 53.4 kg (117 lb 11.2 oz)    Admission Cmt: None   Principal Problem: None                Active Insurance as of 2/27/2022     Primary Coverage     Payor Plan Insurance Group Employer/Plan Group    ANTHEM BLUE CROSS ANTHEM BLUE CROSS BLUE SHIELD PPO [id number]     Payor Plan Address Payor Plan Phone Number Payor Plan Fax Number Effective Dates    [address] [phone]  1/1/2021 - None Entered    [facility] [address]       Subscriber Name Subscriber Birth Date Member ID       BING STERLING 1995 [id number]           Secondary Coverage     Payor Plan Insurance Group Employer/Plan Group    ANTHEM MEDICAID ANTHEM MEDICAID [id number]     Payor Plan Address Payor Plan Phone Number Payor Plan Fax Number Effective Dates    [address] [address] [phone]  1/1/2017 - None Entered    [facility] [address]       Subscriber Name Subscriber Birth Date Member ID       BING STERLING 1995 [id number]                 Emergency Contacts      (Rel.) Home Phone Work Phone Mobile Phone    " Daren Sterling (Spouse) [phone] -- --               Discharge Summary      Markos Bearden DO at 22 1141          [facility]  Delivery Discharge Summary    Primary OB Clinician: Markos Bearden DO    EDC: Estimated Date of Delivery: 3/16/22    Gestational Age:37w5d    Antepartum complications: none    Date of Delivery: 2022   Time of Delivery: 3:03 PM     Delivered By:  Markos Bearden     Delivery Type: Vaginal, Spontaneous      Tubal Ligation: n/a    Baby:female  infant;   Apgar:  8  @ 1 minute /   Apgar:  9  @ 5 minutes   Weight: 2725 g (6 lb 0.1 oz)    Length: 19.488     Anesthesia: Epidural      Intrapartum complications: None    Laceration: Yes  Laceration Information  Laceration Repaired?   Perineal: 2nd  Yes    Periurethral:       Labial:       Sulcus:       Vaginal:       Cervical:         Suture used for repair: 2-0 chromic gut    Episiotomy: No    Placenta: Spontaneous     Feeding method: Breastfeeding Status: No    Discharge Date: 3/2/2022; Discharge Time: 11:42 EST    Plan:    Follow-up appointment with primary OB/GYN in 2 weeks.    Electronically signed by Markos Bearden DO at 22 1144

## 2022-03-02 NOTE — L&D DELIVERY NOTE
Florentin  Vaginal Delivery Note    Delivery     Delivery: Vaginal, Spontaneous     YOB: 2020    Time of Birth: 3:03 PM      Anesthesia: Epidural     Delivering clinician: Markos Bearden    Forceps?   No   Vacuum? No    Shoulder dystocia present: No        Delivery narrative:      Infant    Findings: female  infant     Infant observations: Weight: 2725 g (6 lb 0.1 oz)   Length: 19.488  in  Observations/Comments:  120, 40, 97.2      Apgars: 8  @ 1 minute /    9  @ 5 minutes   Infant Name:      Placenta, Cord, and Fluid    Placenta delivered  Spontaneous  at  2/28/2022  3:07 PM     Cord: 3 vessels  present.   Nuchal Cord?  no   Cord blood obtained: Yes                   Repair    Episiotomy: None    Lacerations: Yes  Laceration Information  Laceration Repaired?   Perineal: 2nd  Yes    Periurethral:       Labial:       Sulcus:       Vaginal:       Cervical:         Suture used for repair: 2-0 chromic gut   Estimated Blood Loss: 100  mls.   Suture used for repair:      Complications  none    Disposition  Mother to postpartum in stable condition.    Markos Bearden DO  03/02/22  11:39 EST       Detail Level: Zone

## 2022-03-02 NOTE — DISCHARGE SUMMARY
LEATHA Lerma  Delivery Discharge Summary    Primary OB Clinician: Markos Bearden DO    EDC: Estimated Date of Delivery: 3/16/22    Gestational Age:37w5d    Antepartum complications: none    Date of Delivery: 2022   Time of Delivery: 3:03 PM     Delivered By:  Markos Bearden     Delivery Type: Vaginal, Spontaneous      Tubal Ligation: n/a    Baby:female  infant;   Apgar:  8  @ 1 minute /   Apgar:  9  @ 5 minutes   Weight: 2725 g (6 lb 0.1 oz)    Length: 19.488     Anesthesia: Epidural      Intrapartum complications: None    Laceration: Yes  Laceration Information  Laceration Repaired?   Perineal: 2nd  Yes    Periurethral:       Labial:       Sulcus:       Vaginal:       Cervical:         Suture used for repair: 2-0 chromic gut    Episiotomy: No    Placenta: Spontaneous     Feeding method: Breastfeeding Status: No    Discharge Date: 3/2/2022; Discharge Time: 11:42 EST    Plan:    Follow-up appointment with primary OB/GYN in 2 weeks.   Orthopaedic Daily Progress Note     Date: 1/4/2022    Attending: Kelsea Gonzalez MD     Problem List   R leg hematoma    Subjective: No new complaints. No chest pain, shortness of breath, nausea or vomiting. Asleep.    Review of Systems:  Reviewed in Epic, updated, no changes.     O:   Vital 24 Hour Range Most Recent Value   Temperature Temp  Min: 97.8 °F (36.6 °C)  Max: 98 °F (36.7 °C) 98 °F (36.7 °C)   Pulse Pulse  Min: 60  Max: 67 61   Respiratory Resp  Min: 17  Max: 18 18   Blood Pressure BP  Min: 133/61  Max: 155/68 (!) 146/74   Pulse Oximetry SpO2  Min: 92 %  Max: 100 % 92 %   Arterial BP No data recorded        Scheduled Medications   • albumin human 25%  25 g Intravenous TID   • furosemide  40 mg Intravenous BID   • sodium chloride (PF)  10 mL Injection 2 times per day   • pantoprazole  40 mg Per NG tube 2 times per day   • carvedilol  6.25 mg Per NG tube 2 times per day   • oxacillin  2,000 mg Intravenous 6 times per day   • insulin lispro   Subcutaneous 4 times per day   • sodium chloride  4 mL Nebulization BID Resp   • ferrous sulfate  300 mg Per NG tube Daily with breakfast   • folic acid  1 mg Per NG tube Daily   • guaifenesin  200 mg Nasogastric 6 times per day   • rOPINIRole  1 mg Per NG tube Nightly   • metOLazone  5 mg Per NG tube BID   • [Held by provider] finasteride  5 mg Oral Daily   • Potassium Standard Replacement Protocol   Does not apply See Admin Instructions   • Magnesium Standard Replacement Protocol   Does not apply See Admin Instructions   • Phosphorus Standard Replacement Protocol   Does not apply See Admin Instructions   • [Held by provider] WARFARIN - PHARMACIST MONITORED   Does not apply See Admin Instructions   • sodium chloride (PF)  2 mL Intracatheter 2 times per day        Physical Exam   General - Asleep   HEENT - Pupils equally round and reactive to light. Sclerae are anicteric. Oropharyngeal mucous membranes are moist. Neck is soft and supple.   Cardiovascular -  Peripheral  pulses are 2+ and symmetric. No jugular venous distension (JVD).  Abdomen - Soft, nontender and nondistended.   Extremities - Moderate, diffuse swelling right lower extremity with considerable posterior ecchymosis in the thigh and upper calf.  Swelling appears to be diffuse and circumferential.  I do not feel a distinct hematoma or abscess.  Compartments are soft.  No evidence of compartment syndrome.  No knee effusion.  Hip is stable.  Patient unable to or unwilling to move the upper extremity for range of motion testing.  Skin- No rashes or lesions.   Neurologic - Drowsy.      Imaging   12/20/21.  X-rays right shoulder.  No acute osseous findings.  12/20/21.  X-rays pelvis.  No acute osseous findings  12/27/21.  Ultrasound lower extremity venous duplex right.  No ultrasound evidence of DVT right lower extremity.  Diminished flow in right posterior tibialis vein.     Microbiology   None     Assessment   Right leg swelling, ecchymosis.   No evidence of compartment syndrome.    Plan   Continue observation.  Mobilize as tolerated.     Lonny Burgess MD   Page:  [phone]

## 2022-03-02 NOTE — DISCHARGE INSTRUCTIONS
No lifting pushing or pulling over 10 lbs for 6 weeks.  No tampons douching or intercourse for 6 weeks.  You may shower but no tub baths or submersion in water for 6 weeks.  Notify your doctor for fever, chills, worsening abdominal pain, vaginal bleeding heavier than a period or passing clots, swelling in your hands face or feet or visual changes such as blurry or spotty vision, .  Continue to monitor your blood pressures at home as before and notify your doctor for readings greater than 140/90   See attached education sheets.

## 2022-03-07 NOTE — H&P
There are no changes to the scanned in prenatal record.  The patient was admitted in active labor.

## 2022-03-09 ENCOUNTER — PATIENT OUTREACH (OUTPATIENT)
Dept: LABOR AND DELIVERY | Facility: HOSPITAL | Age: 27
End: 2022-03-09

## 2022-03-09 NOTE — OUTREACH NOTE
Motherhood Connection  Postpartum Check-In        Review of Systems    Left message for patient to call back.    Apoorva Turcios RN  Maternity Nurse Navigator    3/9/2022, 13:09 EST

## 2022-03-10 ENCOUNTER — PATIENT OUTREACH (OUTPATIENT)
Dept: LABOR AND DELIVERY | Facility: HOSPITAL | Age: 27
End: 2022-03-10

## 2022-03-10 NOTE — OUTREACH NOTE
Motherhood Connection  Postpartum Check-In    Questions/Answers    Flowsheet Row Responses   Visit Setting Telephone   Best Method for Contacting Home   OB Discharge Note Reviewed  Reviewed   OB Discharge Navigator Reviewed  Reviewed   OB Discharge Medications Reviewed  Reviewed   [place] discharged home with mother? Yes   Current Pain Levels 0-10 0   At Rest Pain Levels 0-10 0   Pain level with activity 0-10 1   Acceptable Pain Level 0-10 3   Pain Location Abdomen   Pain Description Sore   How do you treat your pain? Medications   Verbalized Emotional State Acceptance   Family/Support Network Family, Significant Other   Level of Involvement in Care Attentive, Interactive, Supportive   Do you feel comfortable in your relationship with your baby? Yes   Have members of your household adjusted to your baby? Yes   Is the baby's father supportive and/or involved with the baby? Yes   How does your partner feel about the baby? Happy   Do you feel safe at home, school and work? Yes   Are you in a relationship with someone who threatens you or hurts you? No   Do you have the resources to keep yourself and your baby healthy and safe? Yes   Lochia (per patient report) Brown-francine Red   Amount Scant   Number of pads per day 3   Lochia Odor None   Is patient breastfeeding? No   How is breast suppression going? pt states her breast seem to be less full today. Discussed using cabbage leaves for breast discomfort.   Postpartum Depression Screening Education Education Provided   Peripheral Blood Glucose Other   Doctor Appointments: Education Provided   Breastfeeding Education Other   Family Planning Education Declined   Postpartum Care Education Education Provided   S & S to report Education Provided   Followup Appointments Made Yes   Well Child Visit Appointments Made Yes   Appointment Date 22   Provider/Agency [facility]   Peripheral Blood Glucose Education Referred NA   Breast feeding education other comments NA   Well Child  Checkup Provider Name [facility]   Well Child Check Up Date: 03/07/22   Did you complete the visit? Yes   Were there any specific concerns? No   Umbilical Cord No reported signs or symptoms   Was the baby circumcised? No   Feeding Readiness Cues: Eager, Energy for feeding   Infant Feeding Method Formula   Feeding Tolerance Alert for feeding, Adequate pause for breath   Is a lactation referral indicated? No   Formula Type [place] Good Start   Formula PO (mL) 60ml   Formula/Expressed Milk frequency of feedings: every 3 -4 hours   Number of wet diapers x 24 hours 5   Last BM x 24 hours 2   Emesis (Unmeasured Occurence) 2 small   What safe sleep surface is available? Bassinet   Are there stuffed animals, toys, pillows, quilts, blankets, wedges, positioners, bumpers or other loose bedding in the infant's sleeping environment? No   Where does the baby usually sleep? Bassinet   Does the baby ever share a sleep surface with a sibling, adult or pet? No   Does the baby ever share a sleep surface in a bed, couch, recliner or other? No   What position do you place your baby to sleep for naps? Back   What position do you place your baby to sleep at night Back   Are you and/or other caregivers smoking inside or outside the baby's home? No   Is the infant dressed appropiately for the temperature of the home? Yes   Do you use a clean, dry pacifier that is not attached to a string or stuffed animal? Yes          Review of Systems    Spoke with patient over the phone. Pt reports both she and her infant are doing well at home. Pt states she has all needed resources and supplies and denies any needs at this time.    Apoorva Turcios RN  Maternity Nurse Navigator    3/10/2022, 16:30 EST

## 2022-03-17 ENCOUNTER — PATIENT OUTREACH (OUTPATIENT)
Dept: CALL CENTER | Facility: HOSPITAL | Age: 27
End: 2022-03-17

## 2022-03-17 NOTE — OUTREACH NOTE
Motherhood Connection Survey    Flowsheet Row Responses   [facility] patient discharged from? [place]   Week 1 attempt successful? Yes   Call start time 1352   Call end time 1359   [place] discharged home with mother? Yes   Verbalized Emotional State Acceptance   Family/Support Network Family, Significant Other   Level of Involvement in Care Attentive   Do you feel comfortable in your relationship with your baby? Yes   Have members of your household adjusted to your baby? Yes   Is the baby's father supportive and/or involved with the baby? Yes   How does your partner feel about the baby? Happy   Do you feel safe at home, school and work? Yes   Do you have the resources to keep yourself and your baby healthy and safe? Yes   Lochia (per patient report) Similar to Menstrual Flow   Amount Other   Lochia Odor None   Is patient breastfeeding? No   Postpartum Depression Screening Education Education Provided   Followup Appointments Made Yes   Well Child Visit Appointments Made Yes   Appointment Date 22   Well Child Check Up Date: 22   Did you complete the visit? Yes   Were there any specific concerns? No   Umbilical Cord No reported signs or symptoms  [Fell off yesterday 3/16/22]   Feeding Readiness Cues: Eager, Energy for feeding   Infant Feeding Method Formula   Feeding Tolerance Alert for feeding   Is a lactation referral indicated? No   Formula Type Similac Special Care   Number of wet diapers x 24 hours 4   Last BM x 24 hours 2   Are there stuffed animals, toys, pillows, quilts, blankets, wedges, positioners, bumpers or other loose bedding in the infant's sleeping environment? No   Where does the baby usually sleep? Bassinet   Does the baby ever share a sleep surface in a bed, couch, recliner or other? No   What position do you lay your baby down to sleep? Back   Are you and/or other caregivers smoking inside or outside the baby's home? No   Is the infant dressed appropiately for the temperature of  the home? Yes   Heavier than normal period No   Delivery type Vaginal   Peripheral Blood Glucose Education Provided  [BP issues before preganancy and is being followed by OB.]   Call completed? Yes            DEEPAK HANNON - Registered Nurse

## 2024-02-11 ENCOUNTER — PREP FOR SURGERY (OUTPATIENT)
Dept: OTHER | Facility: HOSPITAL | Age: 29
End: 2024-02-11
Payer: MEDICAID

## 2024-02-11 DIAGNOSIS — Z30.2 ENCOUNTER FOR FEMALE STERILIZATION PROCEDURE: Primary | ICD-10-CM

## 2024-02-11 RX ORDER — SODIUM CHLORIDE 9 MG/ML
40 INJECTION, SOLUTION INTRAVENOUS AS NEEDED
OUTPATIENT
Start: 2024-02-11

## 2024-02-11 RX ORDER — SODIUM CHLORIDE 0.9 % (FLUSH) 0.9 %
10 SYRINGE (ML) INJECTION EVERY 12 HOURS SCHEDULED
OUTPATIENT
Start: 2024-02-11

## 2024-02-11 RX ORDER — SODIUM CHLORIDE 0.9 % (FLUSH) 0.9 %
10 SYRINGE (ML) INJECTION AS NEEDED
OUTPATIENT
Start: 2024-02-11

## 2024-02-11 NOTE — H&P
This 28 year old patient presents for Discuss Tubal.      History of Present Illness:  1.  Discuss Tubal   She is here requesting permanent sterilization.  She has 2 children and is definitely finished.  Her  is in agreements with this.  She has an IUD but does not want to take any chances at all.  We discussed the option of bilateral salpingectomy versus Filshie clips and she desires to proceed with a bilateral salpingectomy.  We discussed the procedure risks and benefits in detail.  We discussed the risk of regret at length.                Screening Tools  Other Screenings  Encounter Date Performed Date Screening Tool Score Severity/ Interpretation MDD Classification Scanned Document   2023 Patient Health Questionnaire (PHQ-2) 0 Negative         Gynecologic History  Patient is postmenopausal.   2023 04:50 PM  Date of last Pap: 2022.  Pregnancy # Birth order Date Delivery Type GA(wks) Labor(hrs) Weight Sex   1  2017  39w4d  6 lb(s) 1 oz Female   2  2022  37w5d  6 lb(s) 2 oz Female   Past Systemic History    Medical History  (Reviewed,updated)  Disease Onset Date Comments   Hypertension 2020    Allergies, seasonal since childhood         Surgical History/Management  (Reviewed,updated)    Diagnostics History  Status Study Ordered Completed Interpretation  Result / Report   completed FETAL NON-STRESS TEST 2022   Reactive   completed OB US < 14 WKS, SINGLE FETUS 2017      result received OB US < 14 WKS, SINGLE FETUS 2021 See module     completed OB US >/= 14 WKS, SNGL FETUS 2017      completed OB US >/= 14 WKS, SNGL FETUS 10/22/2021 10/22/2021 See module     completed OB US, FOLLOW-UP, PER FETUS 06/15/2017 2017      completed OB US, FOLLOW-UP, PER FETUS 2017      completed OB US, FOLLOW-UP, PER FETUS 2017      result received OB US, FOLLOW-UP, PER FETUS  2022 See module     obtained PAP, send out 2018       result received TRANSVAGINAL US, NON-OB 2022 See module     ordered TRANSVAGINAL US, OBSTETRIC 2017         Pap Result, HPV Detail and Treatment Performed  Done Pap and HPV/Treatment Pap Results HPV Details Treatment Results Comments   2022 Pap Performed See module          Problem List  Problem List reviewed.   No active problems      Medications (active prior to today)  Medication Name Sig Description Start Date Stop Date Refilled Rx Elsewhere   atenolol take 1 tablet by oral route  every day //   Y   tizanidine 4 mg capsule take 1 capsule by oral route  every 6 - 8 hours as needed not to exceed 3 doses in 24 hours //   Y     Patient Status   Completed with information received for patient transitioning into care.   Completed with information received for patient in a summary of care record.     Medication Reconciliation  Medications reconciled today.    Medication Reviewed  Adherence Medication Name Sig Desc Elsewhere Status   taking as directed atenolol take 1 tablet by oral route  every day Y Verified   taking as directed tizanidine 4 mg capsule take 1 capsule by oral route  every 6 - 8 hours as needed not to exceed 3 doses in 24 hours Y Verified     Allergies  Ingredient Reaction (Severity) Medication Name Comment   NO KNOWN ALLERGIES            Family History    (Reviewed, updated)  Relationship Family Member Name  Age at Death Condition Onset Age Cause of Death   Father    hypertension  N   Maternal grandmother    malignant neoplasm of lung  Y   Paternal grandmother    hypertension  N   M    Social History  (Reviewed, updated)  Tobacco use reviewed.    Preferred language is English.      Marital Status/Family/Social Support  Marital status:      Tobacco use status: Current non-smoker.    Smoking status: Never smoker.    Tobacco Screening  Patient has never used tobacco. Patient has not  used tobacco in the last 30 days. Patient has not used smokeless tobacco in the last 30 days.    Smoking Status  Type Smoking Status Usage Per Day Years Used Pack Years Total Pack Years    Never smoker           Vaping Use  Screened for vaping? Yes  Status: Not a current user  Vaping cessation discussed: No    Tobacco/Vaping Exposure  No passive vaping exposure.  No passive smoke exposure.    Comments: No exposure    Alcohol  There is no history of alcohol use.     Caffeine  The patient uses caffeine: soda - 12 oz a day.    Lifestyle  Diet  healthy.  Cheondoism/Spiritual  Patient agrees to transfusion.     Home Environment/Safety  Doesn't use seat belts.       Review of Systems  Review of Systems  System Neg/Pos Details   Constitutional Negative Chills, Fatigue, Fever, Malaise, Night sweats, Weight gain and Weight loss.   ENMT Negative Ear drainage, Hearing loss, Nasal drainage, Otalgia, Sinus pressure and Sore throat.   Eyes Negative Eye discharge, Eye pain and Vision changes.   Respiratory Negative Chronic cough, Cough, Dyspnea, Known TB exposure and Wheezing.   Cardio Negative Chest pain, Claudication, Edema and Irregular heartbeat/palpitations.   GI Negative Abdominal pain, Blood in stool, Change in stool pattern, Constipation, Decreased appetite, Diarrhea, Heartburn, Nausea and Vomiting.    Negative Dysuria, Hematuria, Polyuria (Genitourinary), Urinary frequency, Urinary incontinence and Urinary retention.   Endocrine Negative Cold intolerance, Heat intolerance, Polydipsia and Polyphagia.   Neuro Negative Dizziness, Extremity weakness, Gait disturbance, Headache, Memory impairment, Numbness in extremity, Seizures and Tremors.   Psych Negative Anxiety, Depression and Insomnia.   Integumentary Negative Brittle hair, Brittle nails, Change in shape/size of mole(s), Hair loss, Hirsutism, Hives, Pruritus, Rash and Skin lesion.   MS Negative Back pain, Joint pain, Joint swelling, Muscle weakness and Neck pain.    Hema/Lymph Negative Easy bleeding, Easy bruising and Lymphadenopathy.   Allergic/Immuno Negative Contact allergy, Environmental allergies, Food allergies and Seasonal allergies.   Reproductive Positive The patient is post-menopausal.   Reproductive Negative Breast discharge and Breast lumps.     Vital Signs     Time BP mm/Hg Pulse /min Resp /min Temp F Ht ft Ht in Ht cm Wt lb Wt kg BMI kg/m2 BSA m2 O2 Sat%    4:47 /87 75 18 98 4 11 149.86 101 45.813 20.4  99     Measured By  Time Measured by    4:47 PM Samra Shelby       Screening Summary  The following were reviewed: nutrition, tobacco use, alcohol use, caffeine use, drugs of abuse and date of last pap        Physical Exam  Exam Findings Details   Constitutional Normal Well developed.   Neck Exam Normal Palpation - Normal.   Respiratory Normal Inspection - Normal.   Cardiovascular Normal Rhythm - Regular. Extra sounds - None. Murmurs - None.   Abdomen Normal Anterior palpation -  No rebound. No abdominal tenderness. No hepatic enlargement. No hernia.   Genitourinary * Pelvic deferred.   Skin Normal Inspection - Normal.   Psychiatric Normal Orientation - Oriented to time, place, person & situation. Appropriate mood and affect.       Completed Orders (This Visit)  Order Details Reason Side Interpretation Result Additional Info Initial Treatment Date Region   Tobacco cessation counseling      Counseling about tobacco use (procedure)     Patient Health Questionnaire (PHQ-2)    Negative 0      Pre-Visit Planning           BMI Adult Follow-Up             Assessment/Plan  # Detail Type Description    Assessment Body mass index [BMI] 20.0-20.9, adult (Z68.20).         2. Assessment Consultation for female sterilization (Z30.09).    Plan Orders She will be scheduled for LAPAROSCOPY, REMOVE ADNEXA, Next test date is on 02/14/2024. Clinical information/comments: The surgeon scheduled is Markos Bearden DO. bilateral salpingectomy. PAT:  02/14/2024 @ 3:15/4:15PM.          3. Other Orders Orders not associated to today's assessments.    Plan Orders The patient had the following order(s) completed today: Patient Health Questionnaire (PHQ-2). Interpretation: Negative, Result details: 0. Today's instructions / counseling include(s) Tobacco cessation counseling.            Diagnostic History Entered Today  Performed Study Interpretation Result   12/11/2023 BMI Adult Follow-Up     12/11/2023 Pre-Visit Planning       Clinical Guidelines (Reviewed, updated)  Guidelines Status Due Action Last Addressed Comments   BMI completed 01/01/2024 Completed on 12/11/2023 12/11/2023    BMI Adult Follow-Up completed 01/01/2024 Completed on 12/11/2023 12/11/2023    Depression screening completed 12/11/2024 Completed on 12/11/2023 12/11/2023    Hepatitis C screening completed  Completed on 08/26/2021 08/26/2021    HIV Screen completed  Completed on 08/26/2021 08/26/2021    PAP completed 04/20/2025 Completed on 04/20/2022 04/20/2022    Pap/HPV testing completed 06/04/2027 Completed on 03/10/2020 03/10/2020    Pre-Visit Planning completed 12/18/2023 Completed on 12/11/2023 12/11/2023    Tobacco Follow-Up completed 12/11/2024 Completed on 12/11/2023 12/11/2023    Tobacco screening completed 12/11/2024 Completed on 12/11/2023 12/11/2023          Patient Education  # Patient Education   1. Tubal Ligation: Before Your Surgery       Medications (Added, Continued or Stopped today)  Start Date Medication Directions PRN Status PRN Reason Instruction Stop Date    atenolol take 1 tablet by oral route  every day N      02/07/2024 terconazole 0.8 % vaginal cream insert 1 applicatorful by vaginal route  every day at bedtime for 3 nights N   02/10/2024    tizanidine 4 mg capsule take 1 capsule by oral route  every 6 - 8 hours as needed not to exceed 3 doses in 24 hours N        Surgery Scheduled  Surgery Details #1:  The patient has a diagnosis of: Consultation for female sterilization, Z30.09  She is scheduled for:  LAPAROSCOPY, REMOVE ADNEXA, to be performed by Markos Bearden DO    Additional Details:  Patient's Last BMI: 20.40  Comments: bilateral salpingectomy.       Orders/Procedures/Instructions/Educations  Office Procedures/Services  LAPAROSCOPY, REMOVE ADNEXA       Counseling / Educational Factors  Counseling / educational factors reviewed.

## 2024-02-12 ENCOUNTER — PRE-ADMISSION TESTING (OUTPATIENT)
Dept: PREADMISSION TESTING | Facility: HOSPITAL | Age: 29
End: 2024-02-12
Payer: MEDICAID

## 2024-02-12 DIAGNOSIS — Z30.2 ENCOUNTER FOR FEMALE STERILIZATION PROCEDURE: ICD-10-CM

## 2024-02-12 LAB
ABO GROUP BLD: NORMAL
ANION GAP SERPL CALCULATED.3IONS-SCNC: 11.4 MMOL/L (ref 5–15)
BASOPHILS # BLD AUTO: 0.03 10*3/MM3 (ref 0–0.2)
BASOPHILS NFR BLD AUTO: 0.4 % (ref 0–1.5)
BLD GP AB SCN SERPL QL: NEGATIVE
BUN SERPL-MCNC: 8 MG/DL (ref 6–20)
BUN/CREAT SERPL: 12.7 (ref 7–25)
CALCIUM SPEC-SCNC: 9.4 MG/DL (ref 8.6–10.5)
CHLORIDE SERPL-SCNC: 108 MMOL/L (ref 98–107)
CO2 SERPL-SCNC: 21.6 MMOL/L (ref 22–29)
CREAT SERPL-MCNC: 0.63 MG/DL (ref 0.57–1)
DEPRECATED RDW RBC AUTO: 41.1 FL (ref 37–54)
EGFRCR SERPLBLD CKD-EPI 2021: 125.7 ML/MIN/1.73
EOSINOPHIL # BLD AUTO: 0.04 10*3/MM3 (ref 0–0.4)
EOSINOPHIL NFR BLD AUTO: 0.5 % (ref 0.3–6.2)
ERYTHROCYTE [DISTWIDTH] IN BLOOD BY AUTOMATED COUNT: 12.8 % (ref 12.3–15.4)
GLUCOSE SERPL-MCNC: 87 MG/DL (ref 65–99)
HCG SERPL QL: NEGATIVE
HCT VFR BLD AUTO: 36.6 % (ref 34–46.6)
HGB BLD-MCNC: 12 G/DL (ref 12–15.9)
IMM GRANULOCYTES # BLD AUTO: 0.01 10*3/MM3 (ref 0–0.05)
IMM GRANULOCYTES NFR BLD AUTO: 0.1 % (ref 0–0.5)
LYMPHOCYTES # BLD AUTO: 2.64 10*3/MM3 (ref 0.7–3.1)
LYMPHOCYTES NFR BLD AUTO: 36.1 % (ref 19.6–45.3)
MCH RBC QN AUTO: 29 PG (ref 26.6–33)
MCHC RBC AUTO-ENTMCNC: 32.8 G/DL (ref 31.5–35.7)
MCV RBC AUTO: 88.4 FL (ref 79–97)
MONOCYTES # BLD AUTO: 0.59 10*3/MM3 (ref 0.1–0.9)
MONOCYTES NFR BLD AUTO: 8.1 % (ref 5–12)
NEUTROPHILS NFR BLD AUTO: 4 10*3/MM3 (ref 1.7–7)
NEUTROPHILS NFR BLD AUTO: 54.8 % (ref 42.7–76)
NRBC BLD AUTO-RTO: 0 /100 WBC (ref 0–0.2)
PLATELET # BLD AUTO: 268 10*3/MM3 (ref 140–450)
PMV BLD AUTO: 10.9 FL (ref 6–12)
POTASSIUM SERPL-SCNC: 3.7 MMOL/L (ref 3.5–5.2)
RBC # BLD AUTO: 4.14 10*6/MM3 (ref 3.77–5.28)
RH BLD: POSITIVE
SODIUM SERPL-SCNC: 141 MMOL/L (ref 136–145)
T&S EXPIRATION DATE: NORMAL
WBC NRBC COR # BLD AUTO: 7.31 10*3/MM3 (ref 3.4–10.8)

## 2024-02-12 PROCEDURE — 86850 RBC ANTIBODY SCREEN: CPT

## 2024-02-12 PROCEDURE — 36415 COLL VENOUS BLD VENIPUNCTURE: CPT

## 2024-02-12 PROCEDURE — 85025 COMPLETE CBC W/AUTO DIFF WBC: CPT

## 2024-02-12 PROCEDURE — 80048 BASIC METABOLIC PNL TOTAL CA: CPT

## 2024-02-12 PROCEDURE — 84703 CHORIONIC GONADOTROPIN ASSAY: CPT

## 2024-02-12 PROCEDURE — 86900 BLOOD TYPING SEROLOGIC ABO: CPT

## 2024-02-12 PROCEDURE — 86901 BLOOD TYPING SEROLOGIC RH(D): CPT

## 2024-02-12 RX ORDER — LISINOPRIL 10 MG/1
10 TABLET ORAL DAILY
COMMUNITY

## 2024-02-14 ENCOUNTER — ANESTHESIA EVENT (OUTPATIENT)
Dept: PERIOP | Facility: HOSPITAL | Age: 29
End: 2024-02-14
Payer: MEDICAID

## 2024-02-14 ENCOUNTER — ANESTHESIA (OUTPATIENT)
Dept: PERIOP | Facility: HOSPITAL | Age: 29
End: 2024-02-14
Payer: MEDICAID

## 2024-02-14 ENCOUNTER — HOSPITAL ENCOUNTER (OUTPATIENT)
Facility: HOSPITAL | Age: 29
Setting detail: HOSPITAL OUTPATIENT SURGERY
Discharge: HOME | End: 2024-02-14
Attending: OBSTETRICS & GYNECOLOGY | Admitting: NURSE PRACTITIONER
Payer: MEDICAID

## 2024-02-14 ENCOUNTER — APPOINTMENT (OUTPATIENT)
Dept: GENERAL RADIOLOGY | Facility: HOSPITAL | Age: 29
End: 2024-02-14
Payer: MEDICAID

## 2024-02-14 VITALS
SYSTOLIC BLOOD PRESSURE: 118 MMHG | OXYGEN SATURATION: 99 % | HEART RATE: 99 BPM | DIASTOLIC BLOOD PRESSURE: 74 MMHG | BODY MASS INDEX: 19.76 KG/M2 | WEIGHT: 98 LBS | RESPIRATION RATE: 18 BRPM | HEIGHT: 59 IN | TEMPERATURE: 98 F

## 2024-02-14 DIAGNOSIS — Z30.2 ENCOUNTER FOR FEMALE STERILIZATION PROCEDURE: ICD-10-CM

## 2024-02-14 DIAGNOSIS — Z30.2 STERILIZATION: ICD-10-CM

## 2024-02-14 PROCEDURE — 25810000003 LACTATED RINGERS PER 1000 ML: Performed by: ANESTHESIOLOGY

## 2024-02-14 PROCEDURE — 25010000002 MIDAZOLAM PER 1 MG: Performed by: NURSE ANESTHETIST, CERTIFIED REGISTERED

## 2024-02-14 PROCEDURE — 25010000002 ESMOLOL 100 MG/10ML SOLUTION: Performed by: NURSE ANESTHETIST, CERTIFIED REGISTERED

## 2024-02-14 PROCEDURE — 25010000002 NEOSTIGMINE 10 MG/10ML SOLUTION: Performed by: NURSE ANESTHETIST, CERTIFIED REGISTERED

## 2024-02-14 PROCEDURE — 25010000002 FENTANYL CITRATE (PF) 50 MCG/ML SOLUTION: Performed by: NURSE ANESTHETIST, CERTIFIED REGISTERED

## 2024-02-14 PROCEDURE — 25010000002 ONDANSETRON PER 1 MG: Performed by: NURSE ANESTHETIST, CERTIFIED REGISTERED

## 2024-02-14 PROCEDURE — 25010000002 BUPIVACAINE 0.5 % SOLUTION: Performed by: OBSTETRICS & GYNECOLOGY

## 2024-02-14 PROCEDURE — 25010000002 GLYCOPYRROLATE 0.4 MG/2ML SOLUTION: Performed by: NURSE ANESTHETIST, CERTIFIED REGISTERED

## 2024-02-14 PROCEDURE — 25010000002 MEPERIDINE PER 100 MG: Performed by: ANESTHESIOLOGY

## 2024-02-14 PROCEDURE — 25010000002 CEFOXITIN PER 1 G: Performed by: NURSE PRACTITIONER

## 2024-02-14 PROCEDURE — 25010000002 PROPOFOL 200 MG/20ML EMULSION: Performed by: NURSE ANESTHETIST, CERTIFIED REGISTERED

## 2024-02-14 RX ORDER — PROPOFOL 10 MG/ML
INJECTION, EMULSION INTRAVENOUS AS NEEDED
Status: DISCONTINUED | OUTPATIENT
Start: 2024-02-14 | End: 2024-02-14 | Stop reason: SURG

## 2024-02-14 RX ORDER — ROCURONIUM BROMIDE 10 MG/ML
INJECTION, SOLUTION INTRAVENOUS AS NEEDED
Status: DISCONTINUED | OUTPATIENT
Start: 2024-02-14 | End: 2024-02-14 | Stop reason: SURG

## 2024-02-14 RX ORDER — IPRATROPIUM BROMIDE AND ALBUTEROL SULFATE 2.5; .5 MG/3ML; MG/3ML
3 SOLUTION RESPIRATORY (INHALATION) ONCE AS NEEDED
Status: DISCONTINUED | OUTPATIENT
Start: 2024-02-14 | End: 2024-02-14 | Stop reason: HOSPADM

## 2024-02-14 RX ORDER — FENTANYL CITRATE 50 UG/ML
INJECTION, SOLUTION INTRAMUSCULAR; INTRAVENOUS AS NEEDED
Status: DISCONTINUED | OUTPATIENT
Start: 2024-02-14 | End: 2024-02-14 | Stop reason: SURG

## 2024-02-14 RX ORDER — OXYCODONE HYDROCHLORIDE AND ACETAMINOPHEN 5; 325 MG/1; MG/1
1 TABLET ORAL ONCE AS NEEDED
Status: COMPLETED | OUTPATIENT
Start: 2024-02-14 | End: 2024-02-14

## 2024-02-14 RX ORDER — MEPERIDINE HYDROCHLORIDE 25 MG/ML
12.5 INJECTION INTRAMUSCULAR; INTRAVENOUS; SUBCUTANEOUS
Status: DISCONTINUED | OUTPATIENT
Start: 2024-02-14 | End: 2024-02-14 | Stop reason: HOSPADM

## 2024-02-14 RX ORDER — SODIUM CHLORIDE 0.9 % (FLUSH) 0.9 %
10 SYRINGE (ML) INJECTION EVERY 12 HOURS SCHEDULED
Status: DISCONTINUED | OUTPATIENT
Start: 2024-02-14 | End: 2024-02-14 | Stop reason: HOSPADM

## 2024-02-14 RX ORDER — SODIUM CHLORIDE, SODIUM LACTATE, POTASSIUM CHLORIDE, CALCIUM CHLORIDE 600; 310; 30; 20 MG/100ML; MG/100ML; MG/100ML; MG/100ML
125 INJECTION, SOLUTION INTRAVENOUS ONCE
Status: COMPLETED | OUTPATIENT
Start: 2024-02-14 | End: 2024-02-14

## 2024-02-14 RX ORDER — SODIUM CHLORIDE, SODIUM LACTATE, POTASSIUM CHLORIDE, CALCIUM CHLORIDE 600; 310; 30; 20 MG/100ML; MG/100ML; MG/100ML; MG/100ML
100 INJECTION, SOLUTION INTRAVENOUS ONCE AS NEEDED
Status: DISCONTINUED | OUTPATIENT
Start: 2024-02-14 | End: 2024-02-14 | Stop reason: HOSPADM

## 2024-02-14 RX ORDER — IBUPROFEN 600 MG/1
600 TABLET ORAL EVERY 6 HOURS PRN
Qty: 30 TABLET | Refills: 0 | Status: SHIPPED | OUTPATIENT
Start: 2024-02-14 | End: 2024-03-15

## 2024-02-14 RX ORDER — ESMOLOL HYDROCHLORIDE 10 MG/ML
INJECTION INTRAVENOUS AS NEEDED
Status: DISCONTINUED | OUTPATIENT
Start: 2024-02-14 | End: 2024-02-14 | Stop reason: SURG

## 2024-02-14 RX ORDER — KETOROLAC TROMETHAMINE 30 MG/ML
30 INJECTION, SOLUTION INTRAMUSCULAR; INTRAVENOUS EVERY 6 HOURS PRN
Status: DISCONTINUED | OUTPATIENT
Start: 2024-02-14 | End: 2024-02-14 | Stop reason: HOSPADM

## 2024-02-14 RX ORDER — NEOSTIGMINE METHYLSULFATE 1 MG/ML
INJECTION, SOLUTION INTRAVENOUS AS NEEDED
Status: DISCONTINUED | OUTPATIENT
Start: 2024-02-14 | End: 2024-02-14 | Stop reason: SURG

## 2024-02-14 RX ORDER — MAGNESIUM HYDROXIDE 1200 MG/15ML
LIQUID ORAL AS NEEDED
Status: DISCONTINUED | OUTPATIENT
Start: 2024-02-14 | End: 2024-02-14 | Stop reason: HOSPADM

## 2024-02-14 RX ORDER — GLYCOPYRROLATE 0.2 MG/ML
INJECTION INTRAMUSCULAR; INTRAVENOUS AS NEEDED
Status: DISCONTINUED | OUTPATIENT
Start: 2024-02-14 | End: 2024-02-14 | Stop reason: SURG

## 2024-02-14 RX ORDER — HYDROCODONE BITARTRATE AND ACETAMINOPHEN 5; 325 MG/1; MG/1
1 TABLET ORAL EVERY 6 HOURS PRN
Qty: 8 TABLET | Refills: 0 | Status: SHIPPED | OUTPATIENT
Start: 2024-02-14

## 2024-02-14 RX ORDER — SODIUM CHLORIDE 9 MG/ML
40 INJECTION, SOLUTION INTRAVENOUS AS NEEDED
Status: DISCONTINUED | OUTPATIENT
Start: 2024-02-14 | End: 2024-02-14 | Stop reason: HOSPADM

## 2024-02-14 RX ORDER — SODIUM CHLORIDE 0.9 % (FLUSH) 0.9 %
10 SYRINGE (ML) INJECTION AS NEEDED
Status: DISCONTINUED | OUTPATIENT
Start: 2024-02-14 | End: 2024-02-14 | Stop reason: HOSPADM

## 2024-02-14 RX ORDER — FENTANYL CITRATE 50 UG/ML
50 INJECTION, SOLUTION INTRAMUSCULAR; INTRAVENOUS
Status: DISCONTINUED | OUTPATIENT
Start: 2024-02-14 | End: 2024-02-14 | Stop reason: HOSPADM

## 2024-02-14 RX ORDER — MIDAZOLAM HYDROCHLORIDE 1 MG/ML
1 INJECTION INTRAMUSCULAR; INTRAVENOUS
Status: DISCONTINUED | OUTPATIENT
Start: 2024-02-14 | End: 2024-02-14 | Stop reason: HOSPADM

## 2024-02-14 RX ORDER — BUPIVACAINE HYDROCHLORIDE 5 MG/ML
INJECTION, SOLUTION PERINEURAL AS NEEDED
Status: DISCONTINUED | OUTPATIENT
Start: 2024-02-14 | End: 2024-02-14 | Stop reason: HOSPADM

## 2024-02-14 RX ORDER — MIDAZOLAM HYDROCHLORIDE 1 MG/ML
INJECTION INTRAMUSCULAR; INTRAVENOUS AS NEEDED
Status: DISCONTINUED | OUTPATIENT
Start: 2024-02-14 | End: 2024-02-14 | Stop reason: SURG

## 2024-02-14 RX ORDER — ONDANSETRON 2 MG/ML
4 INJECTION INTRAMUSCULAR; INTRAVENOUS AS NEEDED
Status: DISCONTINUED | OUTPATIENT
Start: 2024-02-14 | End: 2024-02-14 | Stop reason: HOSPADM

## 2024-02-14 RX ORDER — ONDANSETRON 2 MG/ML
INJECTION INTRAMUSCULAR; INTRAVENOUS AS NEEDED
Status: DISCONTINUED | OUTPATIENT
Start: 2024-02-14 | End: 2024-02-14 | Stop reason: SURG

## 2024-02-14 RX ADMIN — SODIUM CHLORIDE, POTASSIUM CHLORIDE, SODIUM LACTATE AND CALCIUM CHLORIDE: 600; 310; 30; 20 INJECTION, SOLUTION INTRAVENOUS at 11:39

## 2024-02-14 RX ADMIN — FENTANYL CITRATE 100 MCG: 50 INJECTION INTRAMUSCULAR; INTRAVENOUS at 11:39

## 2024-02-14 RX ADMIN — GLYCOPYRROLATE 0.6 MG: 0.2 INJECTION INTRAMUSCULAR; INTRAVENOUS at 12:05

## 2024-02-14 RX ADMIN — FENTANYL CITRATE 50 MCG: 50 INJECTION, SOLUTION INTRAMUSCULAR; INTRAVENOUS at 13:14

## 2024-02-14 RX ADMIN — NEOSTIGMINE METHYLSULFATE 3 MG: 0.5 INJECTION INTRAVENOUS at 12:05

## 2024-02-14 RX ADMIN — FENTANYL CITRATE 50 MCG: 50 INJECTION, SOLUTION INTRAMUSCULAR; INTRAVENOUS at 12:54

## 2024-02-14 RX ADMIN — ROCURONIUM BROMIDE 30 MG: 10 SOLUTION INTRAVENOUS at 11:43

## 2024-02-14 RX ADMIN — OXYCODONE HYDROCHLORIDE AND ACETAMINOPHEN 1 TABLET: 5; 325 TABLET ORAL at 12:54

## 2024-02-14 RX ADMIN — MEPERIDINE HYDROCHLORIDE 12.5 MG: 25 INJECTION INTRAMUSCULAR; INTRAVENOUS; SUBCUTANEOUS at 13:05

## 2024-02-14 RX ADMIN — ONDANSETRON 4 MG: 2 INJECTION INTRAMUSCULAR; INTRAVENOUS at 11:39

## 2024-02-14 RX ADMIN — MEPERIDINE HYDROCHLORIDE 12.5 MG: 25 INJECTION INTRAMUSCULAR; INTRAVENOUS; SUBCUTANEOUS at 13:10

## 2024-02-14 RX ADMIN — ESMOLOL HYDROCHLORIDE 10 MG: 100 INJECTION, SOLUTION INTRAVENOUS at 12:12

## 2024-02-14 RX ADMIN — PROPOFOL 150 MG: 10 INJECTION, EMULSION INTRAVENOUS at 11:43

## 2024-02-14 RX ADMIN — CEFOXITIN 2 G: 2 INJECTION, POWDER, FOR SOLUTION INTRAVENOUS at 11:47

## 2024-02-14 RX ADMIN — MIDAZOLAM HYDROCHLORIDE 2 MG: 1 INJECTION, SOLUTION INTRAMUSCULAR; INTRAVENOUS at 11:39

## 2024-02-14 NOTE — OP NOTE
SALPINGECTOMY LAPAROSCOPIC  Procedure Note    Bing Sterling  2/14/2024    Pre-op Diagnosis:   Z30.2 STERILIZATION    Post-op Diagnosis:     Desire for permanent sterilization    Procedure(s):  Laparoscopic bilateral salpingectomy    Surgeon(s):  Markos Bearden DO    Anesthesia: General    Estimated Blood Loss: minimal    Specimens:  Order Name Source Comment Collection Info Order Time   TISSUE EXAM, [facility] ([facility], [facility], [facility]) Fallopian Tubes, Bilateral  Collected By: Markos Bearden DO 2/14/2024 11:55 AM     Release to patient   Routine Release              Procedure:  The patient was taken to the operating room, given general anesthesia, prepped and draped in the usual sterile fashion.  The bladder was drained with a straight catheter.  A speculum was placed into the vagina and a uterine manipulator was placed.  The surgeon was regloved and attention made to the abdomen.      A one centimeter incision was made into the umbilicus and an OPTIVIEW trocar was placed into the abdomen under direct visualization using the laparoscope.  The abdomen was filled with CO2 gas up to 15mm mercury pressure.  A second eight millimeter trocar was placed suprapubically to the right of the midline.  A third 5 mm trocar was placed to the left of the midline.  The pelvic contents were visualized and the fallopian tubes were isolated and bathed with marcaine.  We picked up the left fallopian tube and cauterized under using the Enseal device to the point where we got to the cornua flush with the uterus and cauterized across it with the Enseal.  We did the exact same procedure on the opposite side.  The rest of the abdomen and pelvis was grossly normal.     We removed the CO2 gas and trocars.  The skin incisions were closed with a 4-0 monocryl and surgical adhesive.      Sponge, lap and needle counts were correct.       Findings: Nothing unusual.    Complications: none    Grafts or Implants: NA    Markos Bearden DO      Date: 2/14/2024  Time: 13:17 EST

## 2024-02-15 LAB — REF LAB TEST METHOD: NORMAL

## (undated) DEVICE — SUT MNCRYL 4/0 PS2 18 IN

## (undated) DEVICE — TROC BLADLES XCEL/OPTI SLV THRD 8X100

## (undated) DEVICE — GLV SURG PREMIERPRO MIC LTX PF SZ8 BRN

## (undated) DEVICE — PAD XL W/HD REST DERMAPROX ARM PROTECT PNK

## (undated) DEVICE — Device

## (undated) DEVICE — TROC BLADLES XCEL/OPTI SLV THRD 5X100

## (undated) DEVICE — ADHS SKIN PREMIERPRO EXOFIN TOPICAL HI/VISC .5ML

## (undated) DEVICE — SLV TROC ENDOPATHXCEL THRD 5X100MM CB5LT

## (undated) DEVICE — DEV SEAL TISS CRV JAW ENSEAL X1 37CM